# Patient Record
Sex: MALE | Race: ASIAN | NOT HISPANIC OR LATINO | URBAN - METROPOLITAN AREA
[De-identification: names, ages, dates, MRNs, and addresses within clinical notes are randomized per-mention and may not be internally consistent; named-entity substitution may affect disease eponyms.]

---

## 2024-09-04 ENCOUNTER — EMERGENCY (EMERGENCY)
Facility: HOSPITAL | Age: 19
LOS: 1 days | Discharge: HOME | End: 2024-09-04
Attending: EMERGENCY MEDICINE | Admitting: EMERGENCY MEDICINE
Payer: SELF-PAY

## 2024-09-04 VITALS
TEMPERATURE: 98 F | RESPIRATION RATE: 17 BRPM | DIASTOLIC BLOOD PRESSURE: 83 MMHG | HEART RATE: 96 BPM | SYSTOLIC BLOOD PRESSURE: 129 MMHG | OXYGEN SATURATION: 99 %

## 2024-09-04 LAB
ALBUMIN SERPL ELPH-MCNC: 4.9 G/DL — SIGNIFICANT CHANGE UP (ref 3.3–5)
ALP SERPL-CCNC: 98 U/L — SIGNIFICANT CHANGE UP (ref 40–120)
ALT FLD-CCNC: 33 U/L — SIGNIFICANT CHANGE UP (ref 4–41)
ANION GAP SERPL CALC-SCNC: 15 MMOL/L — HIGH (ref 7–14)
AST SERPL-CCNC: 24 U/L — SIGNIFICANT CHANGE UP (ref 4–40)
BASOPHILS # BLD AUTO: 0.05 K/UL — SIGNIFICANT CHANGE UP (ref 0–0.2)
BASOPHILS NFR BLD AUTO: 0.4 % — SIGNIFICANT CHANGE UP (ref 0–2)
BILIRUB SERPL-MCNC: 0.6 MG/DL — SIGNIFICANT CHANGE UP (ref 0.2–1.2)
BUN SERPL-MCNC: 9 MG/DL — SIGNIFICANT CHANGE UP (ref 7–23)
CALCIUM SERPL-MCNC: 10.2 MG/DL — SIGNIFICANT CHANGE UP (ref 8.4–10.5)
CHLORIDE SERPL-SCNC: 102 MMOL/L — SIGNIFICANT CHANGE UP (ref 98–107)
CO2 SERPL-SCNC: 23 MMOL/L — SIGNIFICANT CHANGE UP (ref 22–31)
CREAT SERPL-MCNC: 0.77 MG/DL — SIGNIFICANT CHANGE UP (ref 0.5–1.3)
EGFR: 131 ML/MIN/1.73M2 — SIGNIFICANT CHANGE UP
EGFR: 131 ML/MIN/1.73M2 — SIGNIFICANT CHANGE UP
EOSINOPHIL # BLD AUTO: 0.06 K/UL — SIGNIFICANT CHANGE UP (ref 0–0.5)
EOSINOPHIL NFR BLD AUTO: 0.5 % — SIGNIFICANT CHANGE UP (ref 0–6)
GLUCOSE SERPL-MCNC: 91 MG/DL — SIGNIFICANT CHANGE UP (ref 70–99)
HCT VFR BLD CALC: 46.9 % — SIGNIFICANT CHANGE UP (ref 39–50)
HGB BLD-MCNC: 15.6 G/DL — SIGNIFICANT CHANGE UP (ref 13–17)
IANC: 9.85 K/UL — HIGH (ref 1.8–7.4)
IMM GRANULOCYTES NFR BLD AUTO: 0.5 % — SIGNIFICANT CHANGE UP (ref 0–0.9)
LYMPHOCYTES # BLD AUTO: 17.9 % — SIGNIFICANT CHANGE UP (ref 13–44)
LYMPHOCYTES # BLD AUTO: 2.31 K/UL — SIGNIFICANT CHANGE UP (ref 1–3.3)
MCHC RBC-ENTMCNC: 28.3 PG — SIGNIFICANT CHANGE UP (ref 27–34)
MCHC RBC-ENTMCNC: 33.3 GM/DL — SIGNIFICANT CHANGE UP (ref 32–36)
MCV RBC AUTO: 85.1 FL — SIGNIFICANT CHANGE UP (ref 80–100)
MONOCYTES # BLD AUTO: 0.59 K/UL — SIGNIFICANT CHANGE UP (ref 0–0.9)
MONOCYTES NFR BLD AUTO: 4.6 % — SIGNIFICANT CHANGE UP (ref 2–14)
NEUTROPHILS # BLD AUTO: 9.85 K/UL — HIGH (ref 1.8–7.4)
NEUTROPHILS NFR BLD AUTO: 76.1 % — SIGNIFICANT CHANGE UP (ref 43–77)
NRBC # BLD AUTO: 0 K/UL — SIGNIFICANT CHANGE UP (ref 0–0)
NRBC # BLD: 0 /100 WBCS — SIGNIFICANT CHANGE UP (ref 0–0)
NRBC # FLD: 0 K/UL — SIGNIFICANT CHANGE UP (ref 0–0)
NRBC BLD-RTO: 0 /100 WBCS — SIGNIFICANT CHANGE UP (ref 0–0)
PLATELET # BLD AUTO: 261 K/UL — SIGNIFICANT CHANGE UP (ref 150–400)
POTASSIUM SERPL-MCNC: 3.8 MMOL/L — SIGNIFICANT CHANGE UP (ref 3.5–5.3)
POTASSIUM SERPL-SCNC: 3.8 MMOL/L — SIGNIFICANT CHANGE UP (ref 3.5–5.3)
PROT SERPL-MCNC: 7.8 G/DL — SIGNIFICANT CHANGE UP (ref 6–8.3)
RBC # BLD: 5.51 M/UL — SIGNIFICANT CHANGE UP (ref 4.2–5.8)
RBC # FLD: 12.7 % — SIGNIFICANT CHANGE UP (ref 10.3–14.5)
SODIUM SERPL-SCNC: 140 MMOL/L — SIGNIFICANT CHANGE UP (ref 135–145)
TSH SERPL-MCNC: 0.63 UIU/ML — SIGNIFICANT CHANGE UP (ref 0.27–4.2)
WBC # BLD: 12.92 K/UL — HIGH (ref 3.8–10.5)
WBC # FLD AUTO: 12.92 K/UL — HIGH (ref 3.8–10.5)

## 2024-09-04 PROCEDURE — 70450 CT HEAD/BRAIN W/O DYE: CPT | Mod: 26,MC

## 2024-09-04 PROCEDURE — 99284 EMERGENCY DEPT VISIT MOD MDM: CPT

## 2024-09-04 RX ORDER — METOCLOPRAMIDE HCL 10 MG
10 TABLET ORAL ONCE
Refills: 0 | Status: COMPLETED | OUTPATIENT
Start: 2024-09-04 | End: 2024-09-04

## 2024-09-04 RX ORDER — ACETAMINOPHEN 500 MG/5ML
1000 LIQUID (ML) ORAL ONCE
Refills: 0 | Status: COMPLETED | OUTPATIENT
Start: 2024-09-04 | End: 2024-09-04

## 2024-09-04 RX ADMIN — Medication 400 MILLIGRAM(S): at 21:14

## 2024-09-04 RX ADMIN — Medication 1000 MILLILITER(S): at 21:14

## 2024-09-05 VITALS
DIASTOLIC BLOOD PRESSURE: 84 MMHG | HEART RATE: 91 BPM | RESPIRATION RATE: 18 BRPM | OXYGEN SATURATION: 99 % | SYSTOLIC BLOOD PRESSURE: 131 MMHG | TEMPERATURE: 98 F

## 2024-09-06 ENCOUNTER — OUTPATIENT (OUTPATIENT)
Dept: OUTPATIENT SERVICES | Facility: HOSPITAL | Age: 19
LOS: 1 days | Discharge: ANOTHER HOSPITAL | End: 2024-09-06
Payer: MEDICAID

## 2024-09-06 PROBLEM — Z78.9 OTHER SPECIFIED HEALTH STATUS: Chronic | Status: ACTIVE | Noted: 2024-09-04

## 2024-09-06 PROCEDURE — 99215 OFFICE O/P EST HI 40 MIN: CPT

## 2024-09-10 DIAGNOSIS — F29 UNSPECIFIED PSYCHOSIS NOT DUE TO A SUBSTANCE OR KNOWN PHYSIOLOGICAL CONDITION: ICD-10-CM

## 2024-12-02 ENCOUNTER — EMERGENCY (EMERGENCY)
Facility: HOSPITAL | Age: 19
LOS: 1 days | Discharge: HOME | End: 2024-12-02
Attending: EMERGENCY MEDICINE | Admitting: EMERGENCY MEDICINE
Payer: MEDICAID

## 2024-12-02 ENCOUNTER — INPATIENT (INPATIENT)
Facility: HOSPITAL | Age: 19
LOS: 8 days | Discharge: HOME | End: 2024-12-11
Attending: STUDENT IN AN ORGANIZED HEALTH CARE EDUCATION/TRAINING PROGRAM | Admitting: STUDENT IN AN ORGANIZED HEALTH CARE EDUCATION/TRAINING PROGRAM
Payer: MEDICAID

## 2024-12-02 VITALS
WEIGHT: 176.37 LBS | RESPIRATION RATE: 16 BRPM | HEART RATE: 93 BPM | DIASTOLIC BLOOD PRESSURE: 75 MMHG | TEMPERATURE: 97 F | OXYGEN SATURATION: 97 % | SYSTOLIC BLOOD PRESSURE: 141 MMHG

## 2024-12-02 VITALS
OXYGEN SATURATION: 98 % | DIASTOLIC BLOOD PRESSURE: 79 MMHG | HEART RATE: 88 BPM | SYSTOLIC BLOOD PRESSURE: 131 MMHG | TEMPERATURE: 98 F | RESPIRATION RATE: 16 BRPM

## 2024-12-02 VITALS
HEART RATE: 94 BPM | OXYGEN SATURATION: 100 % | RESPIRATION RATE: 14 BRPM | TEMPERATURE: 99 F | DIASTOLIC BLOOD PRESSURE: 86 MMHG | SYSTOLIC BLOOD PRESSURE: 146 MMHG

## 2024-12-02 LAB
ADD ON TEST-SPECIMEN IN LAB: SIGNIFICANT CHANGE UP
ALBUMIN SERPL ELPH-MCNC: 4.5 G/DL — SIGNIFICANT CHANGE UP (ref 3.3–5)
ALBUMIN SERPL ELPH-MCNC: 4.7 G/DL — SIGNIFICANT CHANGE UP (ref 3.3–5)
ALP SERPL-CCNC: 79 U/L — SIGNIFICANT CHANGE UP (ref 40–120)
ALP SERPL-CCNC: 83 U/L — SIGNIFICANT CHANGE UP (ref 40–120)
ALT FLD-CCNC: 22 U/L — SIGNIFICANT CHANGE UP (ref 4–41)
ALT FLD-CCNC: 26 U/L — SIGNIFICANT CHANGE UP (ref 4–41)
ANION GAP SERPL CALC-SCNC: 15 MMOL/L — HIGH (ref 7–14)
ANION GAP SERPL CALC-SCNC: 16 MMOL/L — HIGH (ref 7–14)
APAP SERPL-MCNC: <10 UG/ML — LOW (ref 15–25)
APPEARANCE UR: CLEAR — SIGNIFICANT CHANGE UP
AST SERPL-CCNC: 16 U/L — SIGNIFICANT CHANGE UP (ref 4–40)
AST SERPL-CCNC: 24 U/L — SIGNIFICANT CHANGE UP (ref 4–40)
BASOPHILS # BLD AUTO: 0.02 K/UL — SIGNIFICANT CHANGE UP (ref 0–0.2)
BASOPHILS # BLD AUTO: 0.05 K/UL — SIGNIFICANT CHANGE UP (ref 0–0.2)
BASOPHILS NFR BLD AUTO: 0.1 % — SIGNIFICANT CHANGE UP (ref 0–2)
BASOPHILS NFR BLD AUTO: 0.2 % — SIGNIFICANT CHANGE UP (ref 0–2)
BILIRUB SERPL-MCNC: 0.6 MG/DL — SIGNIFICANT CHANGE UP (ref 0.2–1.2)
BILIRUB SERPL-MCNC: 0.6 MG/DL — SIGNIFICANT CHANGE UP (ref 0.2–1.2)
BILIRUB UR-MCNC: NEGATIVE — SIGNIFICANT CHANGE UP
BLOOD GAS VENOUS COMPREHENSIVE RESULT: SIGNIFICANT CHANGE UP
BUN SERPL-MCNC: 15 MG/DL — SIGNIFICANT CHANGE UP (ref 7–23)
BUN SERPL-MCNC: 18 MG/DL — SIGNIFICANT CHANGE UP (ref 7–23)
CALCIUM SERPL-MCNC: 9.6 MG/DL — SIGNIFICANT CHANGE UP (ref 8.4–10.5)
CALCIUM SERPL-MCNC: 9.9 MG/DL — SIGNIFICANT CHANGE UP (ref 8.4–10.5)
CHLORIDE SERPL-SCNC: 101 MMOL/L — SIGNIFICANT CHANGE UP (ref 98–107)
CHLORIDE SERPL-SCNC: 104 MMOL/L — SIGNIFICANT CHANGE UP (ref 98–107)
CO2 SERPL-SCNC: 20 MMOL/L — LOW (ref 22–31)
CO2 SERPL-SCNC: 21 MMOL/L — LOW (ref 22–31)
COLOR SPEC: YELLOW — SIGNIFICANT CHANGE UP
CREAT SERPL-MCNC: 0.85 MG/DL — SIGNIFICANT CHANGE UP (ref 0.5–1.3)
CREAT SERPL-MCNC: 0.89 MG/DL — SIGNIFICANT CHANGE UP (ref 0.5–1.3)
DIFF PNL FLD: NEGATIVE — SIGNIFICANT CHANGE UP
EGFR: 126 ML/MIN/1.73M2 — SIGNIFICANT CHANGE UP
EGFR: 128 ML/MIN/1.73M2 — SIGNIFICANT CHANGE UP
EOSINOPHIL # BLD AUTO: 0 K/UL — SIGNIFICANT CHANGE UP (ref 0–0.5)
EOSINOPHIL # BLD AUTO: 0.02 K/UL — SIGNIFICANT CHANGE UP (ref 0–0.5)
EOSINOPHIL NFR BLD AUTO: 0 % — SIGNIFICANT CHANGE UP (ref 0–6)
EOSINOPHIL NFR BLD AUTO: 0.1 % — SIGNIFICANT CHANGE UP (ref 0–6)
ETHANOL SERPL-MCNC: <10 MG/DL — SIGNIFICANT CHANGE UP
GLUCOSE SERPL-MCNC: 110 MG/DL — HIGH (ref 70–99)
GLUCOSE SERPL-MCNC: 162 MG/DL — HIGH (ref 70–99)
GLUCOSE UR QL: 500 MG/DL
HCT VFR BLD CALC: 43.8 % — SIGNIFICANT CHANGE UP (ref 39–50)
HCT VFR BLD CALC: 43.9 % — SIGNIFICANT CHANGE UP (ref 39–50)
HGB BLD-MCNC: 15.1 G/DL — SIGNIFICANT CHANGE UP (ref 13–17)
HGB BLD-MCNC: 15.1 G/DL — SIGNIFICANT CHANGE UP (ref 13–17)
IANC: 16.33 K/UL — HIGH (ref 1.8–7.4)
IANC: 17.14 K/UL — HIGH (ref 1.8–7.4)
IMM GRANULOCYTES NFR BLD AUTO: 0.6 % — SIGNIFICANT CHANGE UP (ref 0–0.9)
IMM GRANULOCYTES NFR BLD AUTO: 0.9 % — SIGNIFICANT CHANGE UP (ref 0–0.9)
KETONES UR-MCNC: NEGATIVE MG/DL — SIGNIFICANT CHANGE UP
LEUKOCYTE ESTERASE UR-ACNC: NEGATIVE — SIGNIFICANT CHANGE UP
LYMPHOCYTES # BLD AUTO: 1.32 K/UL — SIGNIFICANT CHANGE UP (ref 1–3.3)
LYMPHOCYTES # BLD AUTO: 17.7 % — SIGNIFICANT CHANGE UP (ref 13–44)
LYMPHOCYTES # BLD AUTO: 4.03 K/UL — HIGH (ref 1–3.3)
LYMPHOCYTES # BLD AUTO: 7.4 % — LOW (ref 13–44)
MAGNESIUM SERPL-MCNC: 2.1 MG/DL — SIGNIFICANT CHANGE UP (ref 1.6–2.6)
MAGNESIUM SERPL-MCNC: 2.3 MG/DL — SIGNIFICANT CHANGE UP (ref 1.6–2.6)
MCHC RBC-ENTMCNC: 28.5 PG — SIGNIFICANT CHANGE UP (ref 27–34)
MCHC RBC-ENTMCNC: 28.8 PG — SIGNIFICANT CHANGE UP (ref 27–34)
MCHC RBC-ENTMCNC: 34.4 G/DL — SIGNIFICANT CHANGE UP (ref 32–36)
MCHC RBC-ENTMCNC: 34.5 G/DL — SIGNIFICANT CHANGE UP (ref 32–36)
MCV RBC AUTO: 82.6 FL — SIGNIFICANT CHANGE UP (ref 80–100)
MCV RBC AUTO: 83.8 FL — SIGNIFICANT CHANGE UP (ref 80–100)
MONOCYTES # BLD AUTO: 0.12 K/UL — SIGNIFICANT CHANGE UP (ref 0–0.9)
MONOCYTES # BLD AUTO: 1.32 K/UL — HIGH (ref 0–0.9)
MONOCYTES NFR BLD AUTO: 0.7 % — LOW (ref 2–14)
MONOCYTES NFR BLD AUTO: 5.8 % — SIGNIFICANT CHANGE UP (ref 2–14)
NEUTROPHILS # BLD AUTO: 16.33 K/UL — HIGH (ref 1.8–7.4)
NEUTROPHILS # BLD AUTO: 17.14 K/UL — HIGH (ref 1.8–7.4)
NEUTROPHILS NFR BLD AUTO: 75.3 % — SIGNIFICANT CHANGE UP (ref 43–77)
NEUTROPHILS NFR BLD AUTO: 91.2 % — HIGH (ref 43–77)
NITRITE UR-MCNC: NEGATIVE — SIGNIFICANT CHANGE UP
NRBC # BLD: 0 /100 WBCS — SIGNIFICANT CHANGE UP (ref 0–0)
NRBC # BLD: 0 /100 WBCS — SIGNIFICANT CHANGE UP (ref 0–0)
NRBC # FLD: 0 K/UL — SIGNIFICANT CHANGE UP (ref 0–0)
NRBC # FLD: 0 K/UL — SIGNIFICANT CHANGE UP (ref 0–0)
PH UR: 8 — SIGNIFICANT CHANGE UP (ref 5–8)
PHOSPHATE SERPL-MCNC: 1.9 MG/DL — LOW (ref 2.5–4.5)
PLATELET # BLD AUTO: 270 K/UL — SIGNIFICANT CHANGE UP (ref 150–400)
PLATELET # BLD AUTO: 287 K/UL — SIGNIFICANT CHANGE UP (ref 150–400)
POTASSIUM SERPL-MCNC: 3.5 MMOL/L — SIGNIFICANT CHANGE UP (ref 3.5–5.3)
POTASSIUM SERPL-MCNC: 4.5 MMOL/L — SIGNIFICANT CHANGE UP (ref 3.5–5.3)
POTASSIUM SERPL-SCNC: 3.5 MMOL/L — SIGNIFICANT CHANGE UP (ref 3.5–5.3)
POTASSIUM SERPL-SCNC: 4.5 MMOL/L — SIGNIFICANT CHANGE UP (ref 3.5–5.3)
PROT SERPL-MCNC: 7.5 G/DL — SIGNIFICANT CHANGE UP (ref 6–8.3)
PROT SERPL-MCNC: 7.7 G/DL — SIGNIFICANT CHANGE UP (ref 6–8.3)
PROT UR-MCNC: NEGATIVE MG/DL — SIGNIFICANT CHANGE UP
RBC # BLD: 5.24 M/UL — SIGNIFICANT CHANGE UP (ref 4.2–5.8)
RBC # BLD: 5.3 M/UL — SIGNIFICANT CHANGE UP (ref 4.2–5.8)
RBC # FLD: 11.9 % — SIGNIFICANT CHANGE UP (ref 10.3–14.5)
RBC # FLD: 12.2 % — SIGNIFICANT CHANGE UP (ref 10.3–14.5)
SALICYLATES SERPL-MCNC: <0.3 MG/DL — LOW (ref 15–30)
SODIUM SERPL-SCNC: 137 MMOL/L — SIGNIFICANT CHANGE UP (ref 135–145)
SODIUM SERPL-SCNC: 140 MMOL/L — SIGNIFICANT CHANGE UP (ref 135–145)
SP GR SPEC: 1.05 — HIGH (ref 1–1.03)
TOXICOLOGY SCREEN, DRUGS OF ABUSE, SERUM RESULT: SIGNIFICANT CHANGE UP
TSH SERPL-MCNC: 0.66 UIU/ML — SIGNIFICANT CHANGE UP (ref 0.27–4.2)
UROBILINOGEN FLD QL: 1 MG/DL — SIGNIFICANT CHANGE UP (ref 0.2–1)
WBC # BLD: 17.9 K/UL — HIGH (ref 3.8–10.5)
WBC # BLD: 22.77 K/UL — HIGH (ref 3.8–10.5)
WBC # FLD AUTO: 17.9 K/UL — HIGH (ref 3.8–10.5)
WBC # FLD AUTO: 22.77 K/UL — HIGH (ref 3.8–10.5)

## 2024-12-02 PROCEDURE — 74177 CT ABD & PELVIS W/CONTRAST: CPT | Mod: 26,MC

## 2024-12-02 PROCEDURE — 71260 CT THORAX DX C+: CPT | Mod: 26,MC

## 2024-12-02 PROCEDURE — 99284 EMERGENCY DEPT VISIT MOD MDM: CPT

## 2024-12-02 PROCEDURE — 99291 CRITICAL CARE FIRST HOUR: CPT | Mod: 25

## 2024-12-02 PROCEDURE — 72125 CT NECK SPINE W/O DYE: CPT | Mod: 26,MC

## 2024-12-02 PROCEDURE — 70450 CT HEAD/BRAIN W/O DYE: CPT | Mod: 26,MC

## 2024-12-02 PROCEDURE — 71045 X-RAY EXAM CHEST 1 VIEW: CPT | Mod: 26

## 2024-12-02 PROCEDURE — 62270 DX LMBR SPI PNXR: CPT | Mod: 52

## 2024-12-02 RX ORDER — KETOROLAC TROMETHAMINE 30 MG/ML
15 INJECTION INTRAMUSCULAR; INTRAVENOUS ONCE
Refills: 0 | Status: DISCONTINUED | OUTPATIENT
Start: 2024-12-02 | End: 2024-12-02

## 2024-12-02 RX ORDER — METOCLOPRAMIDE HYDROCHLORIDE 10 MG/1
10 TABLET ORAL ONCE
Refills: 0 | Status: COMPLETED | OUTPATIENT
Start: 2024-12-02 | End: 2024-12-02

## 2024-12-02 RX ORDER — SODIUM,POTASSIUM PHOSPHATES 278-250MG
1 POWDER IN PACKET (EA) ORAL ONCE
Refills: 0 | Status: COMPLETED | OUTPATIENT
Start: 2024-12-02 | End: 2024-12-02

## 2024-12-02 RX ORDER — ACETAMINOPHEN 500MG 500 MG/1
1000 TABLET, COATED ORAL ONCE
Refills: 0 | Status: COMPLETED | OUTPATIENT
Start: 2024-12-02 | End: 2024-12-02

## 2024-12-02 RX ORDER — SODIUM CHLORIDE 9 MG/ML
1000 INJECTION, SOLUTION INTRAMUSCULAR; INTRAVENOUS; SUBCUTANEOUS ONCE
Refills: 0 | Status: COMPLETED | OUTPATIENT
Start: 2024-12-02 | End: 2024-12-02

## 2024-12-02 RX ORDER — LIDOCAINE HCL 20 MG/ML
10 VIAL (ML) INJECTION ONCE
Refills: 0 | Status: COMPLETED | OUTPATIENT
Start: 2024-12-02 | End: 2024-12-02

## 2024-12-02 RX ADMIN — ACETAMINOPHEN 500MG 400 MILLIGRAM(S): 500 TABLET, COATED ORAL at 18:03

## 2024-12-02 RX ADMIN — Medication 10 MILLILITER(S): at 23:38

## 2024-12-02 RX ADMIN — Medication 1 PACKET(S): at 02:57

## 2024-12-02 RX ADMIN — ACETAMINOPHEN 500MG 400 MILLIGRAM(S): 500 TABLET, COATED ORAL at 01:52

## 2024-12-02 RX ADMIN — KETOROLAC TROMETHAMINE 15 MILLIGRAM(S): 30 INJECTION INTRAMUSCULAR; INTRAVENOUS at 01:51

## 2024-12-02 RX ADMIN — SODIUM CHLORIDE 1000 MILLILITER(S): 9 INJECTION, SOLUTION INTRAMUSCULAR; INTRAVENOUS; SUBCUTANEOUS at 18:02

## 2024-12-02 RX ADMIN — SODIUM CHLORIDE 1000 MILLILITER(S): 9 INJECTION, SOLUTION INTRAMUSCULAR; INTRAVENOUS; SUBCUTANEOUS at 01:51

## 2024-12-02 RX ADMIN — METOCLOPRAMIDE HYDROCHLORIDE 10 MILLIGRAM(S): 10 TABLET ORAL at 01:51

## 2024-12-02 NOTE — CONSULT NOTE ADULT - ASSESSMENT
19y (2005) Maira speaking man with a PMHx significant for questionable history of schizophrenia versus bipolar disorder previously prescribed Zyprexa from a psychiatric hospitalization in [place] months ago that ran out of medication 10 days ago that presents with persistent HA with significant features of vision changes with a positional component. WBC currently 22.7K. CTH , CT C spine, and CT CAP negative.     Impression: Persistent positional headache with significant features and possible seizure i/s/o leukocytosis of 22K. Etiology unclear. The level of leukocytosis not fully explained by seizure (if indeed seizure). Concern for increased ICP of unknown cause. Lower suspicion for meninigitis based on physical exam however cannot fully exclude. Would r/o meningitis, elevated icp, other infectious or intracranial etiologies.    Recommendations:  Would obtain LP stat to include the following:  -Obtain opening pressure  - CSF studies including: cell count, glucose, protein, gram stain and culture, acid fast culture, fungal culture, cryptococcal antigen, fungal culture, VDRL, HSV, Lyme, West Nile, PCR Encephalitis panel, oligoclonal bands, myelin basic protein, lactate, IgG index, IgG synthesis rate.  Serum work up: Check UA, Utox, TSH, T3/T4, RPR, vitamin B1, B6, B12, folate, lactate (3.1), Vitamin D 25 OH, creatinine kinase, ammonia,  ESR, CRP, Lyme Abs, WNV, Anti-MOG Ab, NMO/AQP4 Ab.   EKG  Obtain MRI brain w/wo contrast   VEEG to evaluate for focal slowing, epileptiform discharges or seizures  Medical management: Empiric coverage for viral and bacterial meningitis prior to LP. Would not start an AED at this time.   Further infectious work up as per primary team     Rest of management as per primary team     Case to be seen by attending.  Note incomplete until finalized by attending signature/attestation.   19y (2005) Maira speaking man with a PMHx significant for questionable history of schizophrenia versus bipolar disorder previously prescribed Zyprexa from a psychiatric hospitalization in [place] months ago that ran out of medication 10 days ago that presents with persistent HA with significant features of vision changes with a positional component. WBC currently 22.7K. CTH , CT C spine, and CT CAP negative.     Impression: Persistent positional headache with significant features and possible seizure i/s/o leukocytosis of 22K. Etiology unclear. The level of leukocytosis not fully explained by seizure (if indeed seizure). Concern for increased ICP of unknown cause. Lower suspicion for meninigitis based on physical exam however cannot fully exclude. Would r/o meningitis, elevated icp, other infectious or intracranial etiologies.    Recommendations:  Would obtain LP stat to include the following:  -Obtain opening pressure  - CSF studies including: cell count, glucose, protein, gram stain and culture, acid fast culture, fungal culture, cryptococcal antigen, fungal culture, VDRL, HSV, Lyme, West Nile, PCR Encephalitis panel, oligoclonal bands, myelin basic protein, lactate, IgG index, IgG synthesis rate.  Serum work up: Check UA, Utox, TSH, T3/T4, RPR, vitamin B1, B6, B12, folate, lactate (3.1), Vitamin D 25 OH, creatinine kinase, ammonia,  ESR, CRP, Lyme Abs, WNV, Anti-MOG Ab, NMO/AQP4 Ab.   EKG  MRI brain w/wo contrast   24 hour VEEG to evaluate for focal slowing, epileptiform discharges or seizures  Medical management: Empiric coverage for viral and bacterial meningitis prior to LP. Would not start an AED at this time.   Further infectious work up as per primary team     Rest of management as per primary team     Case to be seen by attending.  Note incomplete until finalized by attending signature/attestation.

## 2024-12-02 NOTE — ED PROVIDER NOTE - ATTENDING CONTRIBUTION TO CARE
Attending note:   After face to face evaluation of this patient, I concur with above noted hx, pe, and care plan for this patient.  Rivera: History taken by cousins at patient's bedside as patient is not currently able to talk.  Patient has history of a possible seizure disorder 2 months ago after a party in [place].  Patient was started on medications but did not continue them or follow-up.  Patient had another similar episode about 1 month later.  Last night patient was seen in the ED for headache and felt better and was sent home.  Today patient was in the bathroom when he had episode of shaking and falling to the ground and since then he has been unresponsive.  Family denies any drug use.  Patient's blood pressure is stable and patient is febrile.  Patient is somnolent and minimally responsive to painful stimuli.  However gag is present.  Pupils are reactive.  No seizure-like activity or tongue biting is noted.  There is no defecation or urination.  Lungs are clear and heart is regular.  Neck is supple.  Abdomen soft nontender.  There is no response to questions or commands.  Patient initially appears to be in a postictal state but this could also be due to substance or other metabolic process.  Would get CT head, labs, cardiac monitor, drug screen.  This does not appear to be narcotics given patient's vital signs.  Will ask neuro to evaluate as well for possible seizure disorder. Patient signed out to Dr. Vasquez.

## 2024-12-02 NOTE — CONSULT NOTE ADULT - SUBJECTIVE AND OBJECTIVE BOX
Neurology - Consult Note    -  Spectra: [phone] ([facility]), [phone] ([facility]  -     used: [id number] Manpreet        HPI: Patient PEBBLES FRY is a 19y (2005) Maira speaking man with a PMHx significant for questionable history of schizophrenia versus bipolar disorder previously prescribed Zyprexa from a psychiatric hospitalization in [place] months ago that ran out of medication 10 days ago that presents with headache for 2-3 days.  Patient accompanied by brother who assisted however both patient and brother poor historians. Patient stated that 2-3 days ago he started having a sudden onset sharp occipital headache radiating to the top of the head with associated vision changes (unspecified). He went to the ED at [facility] last night for the headache and was not treated with any medication however headache yet improved on its own and was discharged. CTH at that time was neg. labs at that time significant for leukocytosis of 17k and phos 1.9. Today patient stated HA worsened with now associated worsening positional component (worsens with laying flat). HA has lasted for multiple hours. He also has associated CP and "jitters". As per Brother, today had an episode where he was walking to his room and collapsed without headstrike and LOC? for around 2-3min with possible post ictal state and slight urine incontinence without tongue biting.   He is endorsing mild posterior neck pain, whole body weakness and cold sweats. Denied numbness, fevers, recent travel, room spinning dizziness, rashes, sick contacts. [place].    Regards to prior HA, patient stated he had 2-3 very similar episodes of headache in the past managed in the hospital and once received a medication for one month which improved the headache. He does not recall the name of the medication.     Of note brother stated that two months ago patient had a similar fall and shaking episode and had an "abnormal brain test". He is not sure what the results were and did not follow up with a neurologist.     While in the ED patient received Ofirmev and fluids did not improve YEPEZ.       Bp 146/86, hr 94, no fever  WBC 17.9 > 22.7 neutrophil predominance   Lactate 3.1     Review of Systems:        All other review of systems is negative unless indicated above.    Allergies:  No Known Allergies      PMHx/PSHx/Family Hx: As above, otherwise see below   No pertinent past medical history    Anxiety        Social Hx:  No reported use of tobacco, alcohol, or illicit drugs    Medications:  MEDICATIONS  (STANDING):    MEDICATIONS  (PRN):        Home Medications:      Vitals:  T(C): 37 (12-02-24 @ 16:32), Max: 37 (12-02-24 @ 16:32)  HR: 90 (12-02-24 @ 17:53) (88 - 94)  BP: 137/72 (12-02-24 @ 17:53) (131/79 - 146/86)  RR: 18 (12-02-24 @ 17:53) (14 - 18)  SpO2: 99% (12-02-24 @ 17:53) (97% - 100%)    Physical Examination:    General - uncomfortable   Cardiovascular - Peripheral pulses palpable, no edema    Neurologic Exam:  Mental status - Awake, Alert, Oriented to person, place, and time. Speech fluent but hypophonic, repetition and naming intact. Follows simple and complex commands. attention, concentration and fund of knowledge limited.     Cranial nerves:  CN II: Visual fields are full to confrontation. Pupils are 3 mm and reactive to light.   CN III, IV, VI: EOMI, no nystagmus, no ptosis  CN V: Facial sensation is intact to pinprick in all 3 divisions bilaterally.  CN VII: Face is symmetric with normal eye closure and smile.  CN VII: Hearing is normal to rubbing fingers  CN IX, X: Palate elevates symmetrically. Phonation is normal.  CN XI: Head turning and shoulder shrug are intact  CN XII: Tongue is midline with normal movements and no atrophy.    Motor - Normal bulk and tone throughout. No pronator drift.  Posterior neck pain and occipital tenderness  Negative kernig and brudzinski signs    Strength testing  On confrontation likely 5/5 with giveway weakness throughout     neg hoffmans b/l    Sensation - Light touch/temperature intact throughout    DTR's -             Biceps      Triceps     Brachioradialis      Patellar    Ankle    Toes/plantar response  R             2+             2+                  2+                       2+            2+                 Down  L              2+             2+                 2+                        2+           2+                 Down    Coordination - Finger to Nose intact b/l. No tremors appreciated    Gait and station - On attempt for ambulation patient slipped off the bed needing two person assist to lay him back on the stretcher    Labs:                        15.1   22.77 )-----------( 287      ( 02 Dec 2024 16:20 )             43.9     12-02    140  |  104  |  18  ----------------------------<  110[H]  3.5   |  21[L]  |  0.89    Ca    9.6      02 Dec 2024 16:20  Phos  1.9     12-02  Mg     2.30     12-02    TPro  7.5  /  Alb  4.5  /  TBili  0.6  /  DBili  x   /  AST  16  /  ALT  22  /  AlkPhos  79  12-02    CAPILLARY BLOOD GLUCOSE      POCT Blood Glucose.: 145 mg/dL (02 Dec 2024 16:25)    LIVER FUNCTIONS - ( 02 Dec 2024 16:20 )  Alb: 4.5 g/dL / Pro: 7.5 g/dL / ALK PHOS: 79 U/L / ALT: 22 U/L / AST: 16 U/L / GGT: x             Urinalysis with Rflx Culture (collected 02 Dec 2024 20:35)               Radiology:  CT Head No Cont:  (02 Dec 2024 19:19)  CT HEAD:  No acute intracranial hemorrhage, mass effect, or midline shift.    CT CERVICAL SPINE:  No acute fracture or traumatic subluxation.    Ct CAP w iv contrast:  IMPRESSION:  No acute intrathoracic, abdominal or pelvic findings.

## 2024-12-02 NOTE — ED ADULT NURSE NOTE - OBJECTIVE STATEMENT
patient brought in by brother stating he was unconscious and unsure what happened, also states that patient takes meds for his brain. patient breathing spontaneously , not answering questions , asking for his mom and water . connected to CM shows NSR. labs done as ordered IV saline inserted and flushes well. will continue to monitor. family denies any use of alcohol  or drugs.

## 2024-12-02 NOTE — ED PROVIDER NOTE - CLINICAL SUMMARY MEDICAL DECISION MAKING FREE TEXT BOX
19-year-old male with a history of anxiety,?seizures presenting with altered mental status.  Per patient's cousins at bedside patient had possible seizure activity approximately 2 months ago. Concern for toxidrome vs postictal state vs infectious process or intracranial pathology.  Patient afebrile.  No known substance ingestion/intoxication per family. Labs, CXR, CTs, UA ordered.

## 2024-12-02 NOTE — ED ADULT TRIAGE NOTE - CHIEF COMPLAINT QUOTE
Pt arrived to walk in triage unresponsive w/ pulse and unlabored respirations. Pt brought in by wheelchair w/ family member. Charge RN made aware. Pt brought directly to TR B.

## 2024-12-02 NOTE — ED ADULT NURSE REASSESSMENT NOTE - NS ED NURSE REASSESS COMMENT FT1
pt restless in stretcher, reporting increasing chest pain. repeat EKG completed at bedside. pt urinated on bed, pt cleaned and linens changed. condom catheter in place. maintained on cardiac monitor, VSS. safety measures maintained, side rails up x2. visitor at bedside

## 2024-12-02 NOTE — ED PROVIDER NOTE - PATIENT PORTAL LINK FT
You can access the FollowMyHealth Patient Portal offered by [facility] by registering at the following website: [email or web address]. By joining [employer]’s FollowMyHealth portal, you will also be able to view your health information using other applications (apps) compatible with our system.

## 2024-12-02 NOTE — ED PROVIDER NOTE - MDM ORDERS SUBMITTED SELECTION
[facility] [address] [place]   Imaging Studies/Medications

## 2024-12-02 NOTE — ED PROVIDER NOTE - OBJECTIVE STATEMENT
19-year-old male with a history of anxiety,?seizures presenting with altered mental status.  Per patient's cousins at bedside patient had possible seizure activity approximately 2 months ago.  He was evaluated at a hospital in [place] at which time they did imaging and they did not find anything or start him on any medications.  About 1 month ago he had a similar episode that was attributed to anxiety and he was started on Zyprexa.  He took Zyprexa for approximately 30 days but stopped 10 days ago 2/2 running out of his medications.  He was evaluated last night in the emergency department for a headache and treated for headache.  He was feeling significantly improved before going home earlier today.  While at home he went to go to the bathroom and had an episode of shaking and fell to the ground.  His family was concerned and he presented to the ED for further evaluation.  Unable to complete ROS 2/2 AMS.

## 2024-12-02 NOTE — ED PROVIDER NOTE - CLINICAL SUMMARY MEDICAL DECISION MAKING FREE TEXT BOX
Keanu Valentine, PGY3 - [facility]  used ID number [id number] [facility]. this is a 19-year-old male with past medical history of possible anxiety was on Zyprexa unknown dosage for about a month ran out of medications about 10 days ago presenting today for 1 week of headache.  Headache started gradually.  In the posterior head.  No vision changes.  Has a bit of nausea but no vomiting.  No fever or chills.  No neck stiffness.  No chest pain shortness of breath.  No abdominal pain.  No urinary symptoms.  No sexual activity.  No recent travel.  No bug bites or rash. Denies any SI or HI or AH or VH. Vital signs within normal limits.  Patient well-appearing not in acute distress.  Affect is slightly odd.  However thought process is linear.  No focal neurodeficits.  Cranial nerves are intact.  No acute respiratory distress.  Abdomen soft and nontender.  Most likely headache possibly due to withdrawing from Zyprexa as patient ran out of medications about 10 days ago.  Will give follow-up with psych crisis center.  Will obtain basic blood work.  Will give of fluid bolus Toradol Ofirmev and Reglan for headache.  Will reassess afterwards.  Most likely DC with neuro and psych follow-up.  Do not think this is meningitis.  Do not think this is intracranial bleeding.

## 2024-12-02 NOTE — ED PROVIDER NOTE - PROGRESS NOTE DETAILS
Keanu Valentine, PGY3 - patient reassessed. Appears sleepy. Will reassess again in a little bit when brother comes back. Keanu Valentine, PGY3 - patient feeling much better. brother here to bring him back home. will dc w/ psych and neuro f/u.

## 2024-12-02 NOTE — ED PROVIDER NOTE - PHYSICAL EXAMINATION
GENERAL: lethargic  HEAD: normocephalic, atraumatic  HEENT: normal conjunctiva, oral mucosa dry  CARDIAC: regular rate and rhythm  PULM: no increased work of breathing, CTAB  GI: abdomen nondistended, soft, nontender  : no suprapubic tenderness  NEURO: moving all 4 extremities, lethargic, not answering questions, PERLLA  MSK: no peripheral edema  SKIN: extremities warm

## 2024-12-02 NOTE — ED PROVIDER NOTE - PROGRESS NOTE DETAILS
[place] PGY3 - Lab work notable for WBC 22, lactate 3.1.  Lab work otherwise unremarkable.  CT head/C-spine/chest/abdomen/pelvis without any acute pathology.  UA pending.  Neurology consulted for possible seizure activity. CORNELL:  Neuro recommending LP for evaluation of elevated ICP.  Low c/f meningitis.  Informed consent had with [facility]  by Dr. Pruett.  Time out called with RN.  Lumbar puncture attempted by Dr. Pruett but unable to obtain csf specimen.  No immediate complications however patient reporting pain with needle insertion despite local anesthetic and declined further attempts or attempt by myself (attending physician).  Patient to be admitted to medicine service.  Signed out to Dr. Meier in stable condition.

## 2024-12-02 NOTE — ED ADULT TRIAGE NOTE - CHIEF COMPLAINT QUOTE
pt c/o fatigue and posterior head pain. states he has the same symptoms as his last visit from 2 days ago. denies phx. pt c/o fatigue and posterior head pain. states he has the same symptoms as his last visit from 2 days ago. denies phx. pt noted to have strange affect in triage.

## 2024-12-02 NOTE — ED PROVIDER NOTE - NSFOLLOWUPCLINICS_GEN_ALL_ED_FT
[facility]  Neurology  [address] [place] [address] - [address] [place] [address]  Phone: [phone]  Fax:   Follow Up Time: 4-6 Days    [facility]  Psychiatry  [address] [place] [id number]  Phone: [phone]  Fax:   Follow Up Time: 4-6 Days

## 2024-12-02 NOTE — ED PROVIDER NOTE - NSFOLLOWUPINSTRUCTIONS_ED_ALL_ED_FT
You were seen in the emergency department tonight for headache and irritability    We think your condition is most likely secondary to the fact that you stopped taking the medication that was prescribed by her previous psychiatrist called Zyprexa    It is a known side effect of stopping this medication is causing headaches    We recommend you follow-up with a psychiatrist at our crisis center over the [facility] which is across the street from this hospital    You can walk-in anytime or call to make an appointment so that you can be evaluated by a psychiatrist and restarted on medications as they think you need    In the meantime please take Tylenol, ibuprofen and drink plenty of fluids and get plenty of rest to see if this helps your headache    Attached to these discharge instructions of the blood test that we obtained today please take these to the psychiatrist and follow-up with your primary care doctor with these test as soon as you are discharged from the hospital so that they can see if there is any additional testing or treatment needed as an outpatient.    If at any moment you start having worsening headaches, difficulty seeing or hearing, you start hearing or seeing voices or things that are not seen by other people or you start feeling that you want hurt yourself or others please come back to the hospital as soon as possible for further evaluation and treatment.

## 2024-12-02 NOTE — ED PROVIDER NOTE - ATTENDING CONTRIBUTION TO CARE
Maira Diallo [facility] # [id number]    19-year-old male that has supposedly no medical problems although questionable history of schizophrenia versus bipolar disorder previously on Zyprexa which he ran out of 10 days ago that presents with brother at bedside for headache and generalized fatigue.  Patient states that he was seen here in September for similar issues and since that time was taking Zyprexa unknown dosage but he ran out a few days ago.  He is coming in with 1 week of headaches posterior with generalized fatigue not sleeping well secondary to the headache.  Denies any SI/HI/hallucinations.  Patient is a poor historian but denies any vision or hearing changes, nausea, vomiting, chest pain, shortness of breath, abdominal pain, paresthesias, weakness, falls, rashes.  No foreign travel no sick contacts.  Of note patient states that he went to a party months ago and woke up in the psychiatric hospital in [place] and thus when he got started on these medications for which she does not know why but his brother at bedside states that he has had some mental issues for the last few months.  He has been trying to see a doctor/psychiatrist but has been unsuccessful in getting a appointment and refill of his medications which he was compliant with prior to running out of medications.  Denies any smoking drinking or drugs including no IV drug use    Patient appears uncomfortable constantly playing with his hair and rubbing his head.  He is speaking full sentences.  Cranial nerves II through XII are intact eyes are reactive bilaterally no nystagmus.  Heart is regular rate and rhythm lungs are clear to auscultation abdomen soft nontender moving all 4 extremities actively.  Does not appear to be internally preoccupied.  Denies SI/HI.    19-year-old male that denies any medical issues but a questionable history of schizophrenia versus bipolar disorder previously prescribed Zyprexa from a psychiatric hospitalization in [place] months ago that ran out of medication 10 days ago that presents with headache and irritability and inability to sleep well for the last week.  Patient has a benign exam and his vital signs are noted to be relatively normal although hypertensive but not significant enough to think that this patient is having a CVA.  I reviewed his chart for which she was here in September for similar issues and at that time had a CT of his head which was unremarkable and labs otherwise unremarkable time as well.  Patient's brother at bedside is also poor historian and not able to tell us what dosage his medications were or why he was on this medications but other than just "mental issues".  I do think that patient's headache is most likely secondary to Zyprexa withdrawal as this is a possible side effect of taking the medication then stopping without tapering off.  He has a normal cranial nerve exam and his vital signs are normal.  He denies any risk factors for CVA or meningitis as he is not in the [employer] or not around other sick contacts and has been having symptoms for 7 days but no fevers or chills and he is not necessarily altered per his brother.  Has been tolerating p.o. otherwise.  At this time will work off the thought that this is most likely withdrawal but will get blood test to treat the headache with medications and fluids but clinically I do not think patient requires any imaging today.  I advised brother and patient that he should follow-up outpatient with a psychiatrist for refill of his meds that this is most likely due to him stopping his medications.  I do not think it would be safe to start him on Zyprexa today as we do not know his dosage or the reasons why and he should be formally evaluated by a psychiatrist that could prescribe the medications as an outpatient.  Will reassess after workup in the ED.

## 2024-12-02 NOTE — ED ADULT NURSE NOTE - SUICIDE SCREENING QUESTION 3
The current medical regimen is effective;  continue present plan and medications.    Patient unable to complete

## 2024-12-03 DIAGNOSIS — R51.9 HEADACHE, UNSPECIFIED: ICD-10-CM

## 2024-12-03 DIAGNOSIS — F20.9 SCHIZOPHRENIA, UNSPECIFIED: ICD-10-CM

## 2024-12-03 DIAGNOSIS — Z29.9 ENCOUNTER FOR PROPHYLACTIC MEASURES, UNSPECIFIED: ICD-10-CM

## 2024-12-03 DIAGNOSIS — R41.82 ALTERED MENTAL STATUS, UNSPECIFIED: ICD-10-CM

## 2024-12-03 DIAGNOSIS — Z79.899 OTHER LONG TERM (CURRENT) DRUG THERAPY: ICD-10-CM

## 2024-12-03 LAB
24R-OH-CALCIDIOL SERPL-MCNC: 11.1 NG/ML — LOW (ref 30–80)
ADD ON TEST-SPECIMEN IN LAB: SIGNIFICANT CHANGE UP
ADD ON TEST-SPECIMEN IN LAB: SIGNIFICANT CHANGE UP
ALBUMIN SERPL ELPH-MCNC: 3.9 G/DL — SIGNIFICANT CHANGE UP (ref 3.3–5)
ALP SERPL-CCNC: 65 U/L — SIGNIFICANT CHANGE UP (ref 40–120)
ALT FLD-CCNC: 20 U/L — SIGNIFICANT CHANGE UP (ref 4–41)
AMMONIA BLD-MCNC: 48 UMOL/L — SIGNIFICANT CHANGE UP (ref 11–55)
ANION GAP SERPL CALC-SCNC: 12 MMOL/L — SIGNIFICANT CHANGE UP (ref 7–14)
AST SERPL-CCNC: 17 U/L — SIGNIFICANT CHANGE UP (ref 4–40)
B BURGDOR C6 AB SER-ACNC: NEGATIVE — SIGNIFICANT CHANGE UP
B BURGDOR IGG+IGM SER-ACNC: 0.04 INDEX — SIGNIFICANT CHANGE UP (ref 0.01–0.9)
BILIRUB SERPL-MCNC: 0.4 MG/DL — SIGNIFICANT CHANGE UP (ref 0.2–1.2)
BUN SERPL-MCNC: 14 MG/DL — SIGNIFICANT CHANGE UP (ref 7–23)
CALCIUM SERPL-MCNC: 8.9 MG/DL — SIGNIFICANT CHANGE UP (ref 8.4–10.5)
CHLORIDE SERPL-SCNC: 106 MMOL/L — SIGNIFICANT CHANGE UP (ref 98–107)
CK SERPL-CCNC: 181 U/L — SIGNIFICANT CHANGE UP (ref 30–200)
CO2 SERPL-SCNC: 21 MMOL/L — LOW (ref 22–31)
CREAT SERPL-MCNC: 0.73 MG/DL — SIGNIFICANT CHANGE UP (ref 0.5–1.3)
CRP SERPL-MCNC: <3 MG/L — SIGNIFICANT CHANGE UP
EGFR: 134 ML/MIN/1.73M2 — SIGNIFICANT CHANGE UP
FLUAV AG NPH QL: SIGNIFICANT CHANGE UP
FLUBV AG NPH QL: SIGNIFICANT CHANGE UP
FOLATE SERPL-MCNC: 8.2 NG/ML — SIGNIFICANT CHANGE UP (ref 3.1–17.5)
GAS PNL BLDV: SIGNIFICANT CHANGE UP
GLUCOSE BLDC GLUCOMTR-MCNC: 132 MG/DL — HIGH (ref 70–99)
GLUCOSE SERPL-MCNC: 138 MG/DL — HIGH (ref 70–99)
MAGNESIUM SERPL-MCNC: 2.1 MG/DL — SIGNIFICANT CHANGE UP (ref 1.6–2.6)
PHOSPHATE SERPL-MCNC: 4 MG/DL — SIGNIFICANT CHANGE UP (ref 2.5–4.5)
POTASSIUM SERPL-MCNC: 3.8 MMOL/L — SIGNIFICANT CHANGE UP (ref 3.5–5.3)
POTASSIUM SERPL-SCNC: 3.8 MMOL/L — SIGNIFICANT CHANGE UP (ref 3.5–5.3)
PROT SERPL-MCNC: 6.2 G/DL — SIGNIFICANT CHANGE UP (ref 6–8.3)
RSV RNA NPH QL NAA+NON-PROBE: SIGNIFICANT CHANGE UP
SARS-COV-2 RNA SPEC QL NAA+PROBE: SIGNIFICANT CHANGE UP
SODIUM SERPL-SCNC: 139 MMOL/L — SIGNIFICANT CHANGE UP (ref 135–145)
T3FREE SERPL-MCNC: 2.54 PG/ML — SIGNIFICANT CHANGE UP (ref 2–4.4)
T4 FREE SERPL-MCNC: 1.1 NG/DL — SIGNIFICANT CHANGE UP (ref 0.9–1.8)
TSH SERPL-MCNC: 3.97 UIU/ML — SIGNIFICANT CHANGE UP (ref 0.27–4.2)
VIT B12 SERPL-MCNC: 212 PG/ML — SIGNIFICANT CHANGE UP (ref 200–900)

## 2024-12-03 PROCEDURE — 95720 EEG PHY/QHP EA INCR W/VEEG: CPT

## 2024-12-03 PROCEDURE — 99223 1ST HOSP IP/OBS HIGH 75: CPT

## 2024-12-03 PROCEDURE — 99254 IP/OBS CNSLTJ NEW/EST MOD 60: CPT

## 2024-12-03 RX ORDER — CHLORHEXIDINE GLUCONATE 1.2 MG/ML
1 RINSE ORAL
Refills: 0 | Status: DISCONTINUED | OUTPATIENT
Start: 2024-12-03 | End: 2024-12-11

## 2024-12-03 RX ORDER — CEFTRIAXONE SODIUM 1 G
1000 VIAL (EA) INJECTION EVERY 24 HOURS
Refills: 0 | Status: DISCONTINUED | OUTPATIENT
Start: 2024-12-03 | End: 2024-12-07

## 2024-12-03 RX ORDER — MAGNESIUM, ALUMINUM HYDROXIDE 200-225/5
30 SUSPENSION, ORAL (FINAL DOSE FORM) ORAL EVERY 4 HOURS
Refills: 0 | Status: DISCONTINUED | OUTPATIENT
Start: 2024-12-03 | End: 2024-12-11

## 2024-12-03 RX ORDER — LORAZEPAM 2 MG/1
2 TABLET ORAL EVERY 8 HOURS
Refills: 0 | Status: DISCONTINUED | OUTPATIENT
Start: 2024-12-03 | End: 2024-12-03

## 2024-12-03 RX ORDER — ACETAMINOPHEN 500MG 500 MG/1
650 TABLET, COATED ORAL EVERY 6 HOURS
Refills: 0 | Status: DISCONTINUED | OUTPATIENT
Start: 2024-12-03 | End: 2024-12-11

## 2024-12-03 RX ORDER — ACETAMINOPHEN 500MG 500 MG/1
1000 TABLET, COATED ORAL ONCE
Refills: 0 | Status: COMPLETED | OUTPATIENT
Start: 2024-12-03 | End: 2024-12-03

## 2024-12-03 RX ORDER — LORAZEPAM 2 MG/1
1 TABLET ORAL ONCE
Refills: 0 | Status: DISCONTINUED | OUTPATIENT
Start: 2024-12-03 | End: 2024-12-03

## 2024-12-03 RX ORDER — ACETAMINOPHEN, DIPHENHYDRAMINE HCL, PHENYLEPHRINE HCL 325; 25; 5 MG/1; MG/1; MG/1
3 TABLET ORAL AT BEDTIME
Refills: 0 | Status: DISCONTINUED | OUTPATIENT
Start: 2024-12-03 | End: 2024-12-11

## 2024-12-03 RX ORDER — ONDANSETRON HYDROCHLORIDE 4 MG/1
4 TABLET, FILM COATED ORAL EVERY 8 HOURS
Refills: 0 | Status: DISCONTINUED | OUTPATIENT
Start: 2024-12-03 | End: 2024-12-11

## 2024-12-03 RX ORDER — AZITHROMYCIN 250 MG/1
500 TABLET, FILM COATED ORAL EVERY 24 HOURS
Refills: 0 | Status: COMPLETED | OUTPATIENT
Start: 2024-12-03 | End: 2024-12-05

## 2024-12-03 RX ADMIN — LORAZEPAM 1 MILLIGRAM(S): 2 TABLET ORAL at 23:59

## 2024-12-03 RX ADMIN — ACETAMINOPHEN 500MG 400 MILLIGRAM(S): 500 TABLET, COATED ORAL at 02:00

## 2024-12-03 RX ADMIN — Medication 100 MILLIGRAM(S): at 17:25

## 2024-12-03 RX ADMIN — AZITHROMYCIN 255 MILLIGRAM(S): 250 TABLET, FILM COATED ORAL at 17:25

## 2024-12-03 RX ADMIN — LORAZEPAM 1 MILLIGRAM(S): 2 TABLET ORAL at 23:57

## 2024-12-03 RX ADMIN — CHLORHEXIDINE GLUCONATE 1 APPLICATION(S): 1.2 RINSE ORAL at 17:22

## 2024-12-03 RX ADMIN — ACETAMINOPHEN 500MG 650 MILLIGRAM(S): 500 TABLET, COATED ORAL at 10:18

## 2024-12-03 NOTE — CHART NOTE - NSCHARTNOTEFT_GEN_A_CORE
EEG preliminary read (not final) on the initial recording hour(s) = >1.5 hr    Normal EEG in the awake, drowsy, asleep states.  No epileptiform abnormalities or seizures.    Final report to follow tomorrow morning after completion of study.    EEG Reading Room Ph#: [phone]  Epilepsy Answering Service after 5PM and before 8:30AM: Ph#: [phone]

## 2024-12-03 NOTE — H&P ADULT - HISTORY OF PRESENT ILLNESS
19M hx schizophrenia vs. bipolar disorder, previously on Zyprexa (unknown dose, ran out 10 days ago), presents with headache x3 days. Patient is poor historian, but reports sudden onset occipital-area headache radiating to top of head. Reported unclear visual disturbance to neuro, denies during my exam. Visited ED on 12/2, was discharged after headache self-resolved. Headache since worsened, and he had an episode of "collapse" while walking, w/o headstrike, associated wth 2-3 minute period of reduced consciousness/awareness. Small amount of urine leakage during this episode.

## 2024-12-03 NOTE — ED PROCEDURE NOTE - PROCEDURE ADDITIONAL DETAILS
Two attempts made.  Patient requested to stop procedure 2/2 pain despite lidocaine administration.  Procedure stopped without CSF fluid obtained.

## 2024-12-03 NOTE — PHARMACOTHERAPY INTERVENTION NOTE - COMMENTS
Medication history is incomplete. Unable to verify patient's medication list with two sources. Please call spectra [phone] if you have any questions. Patient/Family not sure of Zyprexa dose.  Spoke to family member to confirm pharmacy.  Called [facility], and they had no records for the patient.

## 2024-12-03 NOTE — H&P ADULT - ATTENDING COMMENTS
agree w/ above  likely psychiatric component to AMS, r/o seizure w/ EEG  can obtain MRI head w/ w/o iv contrast r/o other causes of encephalopathy  wbc count 22k, low concern meningitis afebrile, procal negative, no neck stiffness. Differential not impressive  -can obtain LP to mainly evaluate protein/AI encephalopathy w/u  -empirically tx w/ ceftriaxone 1 gram Q24 and azithromycin 500 mg  -if repeat CBC w/ worsening leukocytosis, or patient develops confusion/neck stiffness then would empirically treat meningitis. agree w/ above  likely psychiatric component to AMS, r/o seizure w/ EEG  can obtain MRI head w/ w/o iv contrast r/o other causes of encephalopathy  wbc count 22k, low concern meningitis afebrile, procal negative, no neck stiffness. Differential not impressive  -can obtain LP to mainly evaluate protein/AI encephalopathy w/u  -empirically tx w/ ceftriaxone 1 gram Q24 and azithromycin 500 mg  -if repeat CBC w/ worsening leukocytosis, or patient develops confusion/neck stiffness then would empirically treat meningitis.  -ED unable to get LP can get neurorads agree w/ above  likely psychiatric component to AMS, r/o seizure w/ EEG  can obtain MRI head w/ w/o iv contrast r/o other causes of encephalopathy  wbc count 22k, low concern meningitis afebrile, procal negative, no neck stiffness. Differential not impressive  -can obtain LP to mainly evaluate protein/AI encephalopathy w/u  -empirically tx w/ ceftriaxone 1 gram Q24 and azithromycin 500 mg  -if repeat CBC w/ worsening leukocytosis, or patient develops confusion/neck stiffness/fevers then would empirically treat meningitis.  check syphilis, Lyme,   -ED unable to get LP can get neurorads

## 2024-12-03 NOTE — H&P ADULT - PROBLEM SELECTOR PLAN 1
- neuro following, requested workup pending  - 24hr VEEG, MR Brain w/wo ordered, pending  - procedure team c/s in am to re-attempt LP  - will hold off on empiric meningitis treatment, would start empiric viral + antibacterial coverage should patient become toxic-appearing  - possibly zyprexa withdrawal? similar presentation in 09/2024

## 2024-12-03 NOTE — ED ADULT NURSE REASSESSMENT NOTE - NS ED NURSE REASSESS COMMENT FT1
pt c/o 1x episode urinary incontinence, no seizure activity or other LOC.  pt hypotensive, liter bolus administered. pain medicine administered. pt c/o 1x episode urinary incontinence, no seizure activity or other LOC.  pt hypotensive, liter bolus administered. pt c/o 1x episode urinary incontinence, no seizure activity or other LOC.  pt hypotensive, liter bolus administered per verbal order MD Dillon. medicated for pain. PCA at bedside for patient care.

## 2024-12-03 NOTE — ED ADULT NURSE REASSESSMENT NOTE - GENERAL PATIENT STATE
comfortable appearance/family/SO at bedside
comfortable appearance/resting/sleeping
comfortable appearance/family/SO at bedside/resting/sleeping

## 2024-12-03 NOTE — H&P ADULT - ASSESSMENT
19M hx schizophrenia vs. bipolar vs. schizoaff, on Zyprexa but recently ran out, comes to ED for waxing-waning headache. Neuro initially concerned for structural vs. infectious vs. metabolic vs. epileptic cause, pt unable to tolerate LP in ED due to pain. Less concerning for meningitis is elapsing-remitting nature, lack of photosensitivity or nuchal rigidity, and presence of similar headache in 09/2024 after stopping zyprexa.

## 2024-12-03 NOTE — H&P ADULT - NSHPPHYSICALEXAM_GEN_ALL_CORE
ICU Vital Signs Last 24 Hrs  T(C): 37.1 (03 Dec 2024 03:20), Max: 37.1 (03 Dec 2024 03:20)  T(F): 98.7 (03 Dec 2024 03:20), Max: 98.7 (03 Dec 2024 03:20)  HR: 77 (03 Dec 2024 03:20) (74 - 94)  BP: 131/66 (03 Dec 2024 03:20) (85/58 - 146/86)  BP(mean): --  ABP: --  ABP(mean): --  RR: 17 (03 Dec 2024 03:20) (14 - 24)  SpO2: 98% (03 Dec 2024 03:20) (98% - 100%)    O2 Parameters below as of 03 Dec 2024 03:20  Patient On (Oxygen Delivery Method): room air

## 2024-12-04 DIAGNOSIS — R56.9 UNSPECIFIED CONVULSIONS: ICD-10-CM

## 2024-12-04 LAB
ALBUMIN SERPL ELPH-MCNC: 4.6 G/DL — SIGNIFICANT CHANGE UP (ref 3.3–5)
ALP SERPL-CCNC: 84 U/L — SIGNIFICANT CHANGE UP (ref 40–120)
ALT FLD-CCNC: 29 U/L — SIGNIFICANT CHANGE UP (ref 4–41)
ANION GAP SERPL CALC-SCNC: 18 MMOL/L — HIGH (ref 7–14)
APTT BLD: 35.8 SEC — HIGH (ref 24.5–35.6)
AST SERPL-CCNC: 23 U/L — SIGNIFICANT CHANGE UP (ref 4–40)
BILIRUB SERPL-MCNC: 0.4 MG/DL — SIGNIFICANT CHANGE UP (ref 0.2–1.2)
BUN SERPL-MCNC: 14 MG/DL — SIGNIFICANT CHANGE UP (ref 7–23)
CALCIUM SERPL-MCNC: 9.8 MG/DL — SIGNIFICANT CHANGE UP (ref 8.4–10.5)
CHLORIDE SERPL-SCNC: 102 MMOL/L — SIGNIFICANT CHANGE UP (ref 98–107)
CO2 SERPL-SCNC: 19 MMOL/L — LOW (ref 22–31)
CREAT SERPL-MCNC: 0.73 MG/DL — SIGNIFICANT CHANGE UP (ref 0.5–1.3)
EGFR: 134 ML/MIN/1.73M2 — SIGNIFICANT CHANGE UP
GLUCOSE SERPL-MCNC: 130 MG/DL — HIGH (ref 70–99)
HCT VFR BLD CALC: 47.5 % — SIGNIFICANT CHANGE UP (ref 39–50)
HGB BLD-MCNC: 15.5 G/DL — SIGNIFICANT CHANGE UP (ref 13–17)
INR BLD: 0.99 RATIO — SIGNIFICANT CHANGE UP (ref 0.85–1.16)
MAGNESIUM SERPL-MCNC: 2 MG/DL — SIGNIFICANT CHANGE UP (ref 1.6–2.6)
MCHC RBC-ENTMCNC: 27.7 PG — SIGNIFICANT CHANGE UP (ref 27–34)
MCHC RBC-ENTMCNC: 32.6 G/DL — SIGNIFICANT CHANGE UP (ref 32–36)
MCV RBC AUTO: 84.8 FL — SIGNIFICANT CHANGE UP (ref 80–100)
NRBC # BLD: 0 /100 WBCS — SIGNIFICANT CHANGE UP (ref 0–0)
NRBC # FLD: 0 K/UL — SIGNIFICANT CHANGE UP (ref 0–0)
PHOSPHATE SERPL-MCNC: 3.7 MG/DL — SIGNIFICANT CHANGE UP (ref 2.5–4.5)
PLATELET # BLD AUTO: 277 K/UL — SIGNIFICANT CHANGE UP (ref 150–400)
POTASSIUM SERPL-MCNC: 4 MMOL/L — SIGNIFICANT CHANGE UP (ref 3.5–5.3)
POTASSIUM SERPL-SCNC: 4 MMOL/L — SIGNIFICANT CHANGE UP (ref 3.5–5.3)
PROLACTIN SERPL-MCNC: 34 NG/ML — HIGH (ref 4.1–18.4)
PROT SERPL-MCNC: 7.8 G/DL — SIGNIFICANT CHANGE UP (ref 6–8.3)
PROTHROM AB SERPL-ACNC: 11.5 SEC — SIGNIFICANT CHANGE UP (ref 9.9–13.4)
RBC # BLD: 5.6 M/UL — SIGNIFICANT CHANGE UP (ref 4.2–5.8)
RBC # FLD: 12.7 % — SIGNIFICANT CHANGE UP (ref 10.3–14.5)
RPR SER-TITR: SIGNIFICANT CHANGE UP
RPR SERPL-ACNC: SIGNIFICANT CHANGE UP
SODIUM SERPL-SCNC: 139 MMOL/L — SIGNIFICANT CHANGE UP (ref 135–145)
T PALLIDUM AB TITR SER: POSITIVE
T PALLIDUM IGG SER QL IF: REACTIVE
WBC # BLD: 13.17 K/UL — HIGH (ref 3.8–10.5)
WBC # FLD AUTO: 13.17 K/UL — HIGH (ref 3.8–10.5)

## 2024-12-04 PROCEDURE — 62270 DX LMBR SPI PNXR: CPT | Mod: GC

## 2024-12-04 PROCEDURE — 99233 SBSQ HOSP IP/OBS HIGH 50: CPT | Mod: 25

## 2024-12-04 PROCEDURE — 70450 CT HEAD/BRAIN W/O DYE: CPT | Mod: 26

## 2024-12-04 PROCEDURE — 99223 1ST HOSP IP/OBS HIGH 75: CPT

## 2024-12-04 RX ORDER — LORAZEPAM 2 MG/1
1 TABLET ORAL ONCE
Refills: 0 | Status: DISCONTINUED | OUTPATIENT
Start: 2024-12-04 | End: 2024-12-04

## 2024-12-04 RX ADMIN — Medication 100 MILLIGRAM(S): at 19:47

## 2024-12-04 RX ADMIN — CHLORHEXIDINE GLUCONATE 1 APPLICATION(S): 1.2 RINSE ORAL at 05:08

## 2024-12-04 RX ADMIN — ACETAMINOPHEN 500MG 650 MILLIGRAM(S): 500 TABLET, COATED ORAL at 13:43

## 2024-12-04 RX ADMIN — AZITHROMYCIN 255 MILLIGRAM(S): 250 TABLET, FILM COATED ORAL at 19:47

## 2024-12-04 RX ADMIN — Medication 4 MILLIGRAM(S): at 11:19

## 2024-12-04 RX ADMIN — LORAZEPAM 1 MILLIGRAM(S): 2 TABLET ORAL at 09:13

## 2024-12-04 NOTE — PROGRESS NOTE ADULT - PROBLEM SELECTOR PLAN 3
- [facility] emailed for med rec  - resume zyprexa when dose is determined  - f/u  recommendations

## 2024-12-04 NOTE — BH CONSULTATION LIAISON ASSESSMENT NOTE - HPI (INCLUDE ILLNESS QUALITY, SEVERITY, DURATION, TIMING, CONTEXT, MODIFYING FACTORS, ASSOCIATED SIGNS AND SYMPTOMS)
19M hx schizophrenia vs. bipolar disorder, previously on Zyprexa (unknown dose, ran out 10 days ago), presents with headache x3 days. Patient is poor historian, but reports sudden onset occipital-area headache radiating to top of head. Reported unclear visual disturbance to neuro, denies during my exam. Visited ED on 12/2, was discharged after headache self-resolved. Headache since worsened, and he had an episode of "collapse" while walking, w/o headstrike, associated wth 2-3 minute period of reduced consciousness/awareness. Small amount of urine leakage during this episode.    Overnight pt had an episode of unresponsiveness for which an RRT was called. According to notes pt was not responding to commands for a period of time but began improving shortly after. Of note pt is primarily Maira speaking with limited English ability. Pt was on EEG at the time which did not demonstrate any epileptiform activity or seizures. Psychiatry consulted for possible catatonia given pt episode of unresponsiveness.    12/4 - pt assessed at bedside today with assistance of [facility] . pt's brother was on the phone for a brief portion of the interview. On exam pt is appropriately answering questions with grossly linear thought process. Pt states he still has an intermittent occipital headache, especially when he puts his hair up into a bun. Pt states that he has episodes of a headache in the back of his head followed by a sensation of "his tongue being stretched backward in his mouth" and that these episodes are associated with urinary incontinence. Pt is not rigid and does not exhibit any mannerisms, reduced speech, or symptoms consistent with social withdrawal.    Of note pt is uncertain of his previous psychiatric diagnosis and the reason for which he takes Zyprexa. He also reports he is not fully confident that the medication which he takes is, in fact, called Zyprexa and endorses that it may be something different. He was initially prescribed this medication when he presented to [facility] in [place] several months ago for a similar episode to this week's presentation. Denies ever having auditory hallucinations or periods of time in which he has significantly elevated mood/increased energy/decreased need for sleep.

## 2024-12-04 NOTE — BH CONSULTATION LIAISON ASSESSMENT NOTE - ADDITIONAL DETAILS / COMMENTS
Tone: cogwheel rigidity [ ], hypertonus [ ], atonia [ ], paratonia: gegenhalten [ ] mitgehen [ ]  Movement: tremor - static [ ] intentional [ ], dysmetria [ ], dysdiadochokinesia [ ]  Reflexes: hyperreflexia [ ], myoclonus [ ], hyporeflexia [ ], primitive reflexes [ ]  Speech: aphasia: motor (expressive)  [ ] sensory (receptive) [ ] conduction aphasia [ ] global aphasia [ ]

## 2024-12-04 NOTE — BH CONSULTATION LIAISON ASSESSMENT NOTE - NSBHCHARTREVIEWVS_PSY_A_CORE FT
Vital Signs Last 24 Hrs  T(C): 36.4 (04 Dec 2024 04:45), Max: 37.4 (04 Dec 2024 00:34)  T(F): 97.5 (04 Dec 2024 04:45), Max: 99.4 (04 Dec 2024 00:34)  HR: 66 (04 Dec 2024 04:45) (66 - 102)  BP: 114/55 (04 Dec 2024 04:45) (113/52 - 135/77)  BP(mean): --  RR: 18 (04 Dec 2024 04:45) (18 - 18)  SpO2: 99% (04 Dec 2024 04:45) (98% - 99%)    Parameters below as of 04 Dec 2024 04:45  Patient On (Oxygen Delivery Method): room air

## 2024-12-04 NOTE — BH CONSULTATION LIAISON ASSESSMENT NOTE - CURRENT MEDICATION
MEDICATIONS  (STANDING):  azithromycin  IVPB 500 milliGRAM(s) IV Intermittent every 24 hours  cefTRIAXone   IVPB 1000 milliGRAM(s) IV Intermittent every 24 hours  chlorhexidine 2% Cloths 1 Application(s) Topical <User Schedule>    MEDICATIONS  (PRN):  acetaminophen     Tablet .. 650 milliGRAM(s) Oral every 6 hours PRN Temp greater or equal to 38C (100.4F), Mild Pain (1 - 3)  aluminum hydroxide/magnesium hydroxide/simethicone Suspension 30 milliLiter(s) Oral every 4 hours PRN Dyspepsia  LORazepam   Injectable 2 milliGRAM(s) IV Push every 8 hours PRN seizure >3 minutes  melatonin 3 milliGRAM(s) Oral at bedtime PRN Insomnia  ondansetron Injectable 4 milliGRAM(s) IV Push every 8 hours PRN Nausea and/or Vomiting

## 2024-12-04 NOTE — RAPID RESPONSE TEAM SUMMARY - NSMEDICATIONSRRT_GEN_ALL_CORE
-1mg ativan x2  -recommend 1mg ativan q8h for likely catatonia  -consider repeat CTH, john. if w/ persistent hypertension  -f/u CBC, CMP, prolactin, coags  -recommend psych eval in AM

## 2024-12-04 NOTE — BH CONSULTATION LIAISON ASSESSMENT NOTE - NSBHCHARTREVIEWLAB_PSY_A_CORE FT
15.5   13.17 )-----------( 277      ( 04 Dec 2024 00:05 )             47.5       12-04    139  |  102  |  14  ----------------------------<  130[H]  4.0   |  19[L]  |  0.73    Ca    9.8      04 Dec 2024 00:05  Phos  3.7     12-04  Mg     2.00     12-04    TPro  7.8  /  Alb  4.6  /  TBili  0.4  /  DBili  x   /  AST  23  /  ALT  29  /  AlkPhos  84  12-04              Urinalysis Basic - ( 04 Dec 2024 00:05 )    Color: x / Appearance: x / SG: x / pH: x  Gluc: 130 mg/dL / Ketone: x  / Bili: x / Urobili: x   Blood: x / Protein: x / Nitrite: x   Leuk Esterase: x / RBC: x / WBC x   Sq Epi: x / Non Sq Epi: x / Bacteria: x        PT/INR - ( 03 Dec 2024 23:55 )   PT: 11.5 sec;   INR: 0.99 ratio         PTT - ( 03 Dec 2024 23:55 )  PTT:35.8 sec          CAPILLARY BLOOD GLUCOSE      POCT Blood Glucose.: 132 mg/dL (03 Dec 2024 23:43)

## 2024-12-04 NOTE — PROGRESS NOTE ADULT - SUBJECTIVE AND OBJECTIVE BOX
[facility] Division of Hospital Medicine  Britt Zavala MD   Available on TEAMS      Overnight,   RRT was called for unresponsiveness. possibly catatonic state. EEG without seizure at the time of event.  Pt was evaluated this am. No change in symptoms.   Unsuccessful LP today, per further recommendations by neuro, no LP needed. Low concern for meningitis. No seizures on EEG.  Neurology rec.  eval.    MEDICATIONS  (STANDING):  azithromycin  IVPB 500 milliGRAM(s) IV Intermittent every 24 hours  cefTRIAXone   IVPB 1000 milliGRAM(s) IV Intermittent every 24 hours  chlorhexidine 2% Cloths 1 Application(s) Topical <User Schedule>    MEDICATIONS  (PRN):  acetaminophen     Tablet .. 650 milliGRAM(s) Oral every 6 hours PRN Temp greater or equal to 38C (100.4F), Mild Pain (1 - 3)  aluminum hydroxide/magnesium hydroxide/simethicone Suspension 30 milliLiter(s) Oral every 4 hours PRN Dyspepsia  LORazepam   Injectable 2 milliGRAM(s) IV Push every 8 hours PRN seizure >3 minutes  melatonin 3 milliGRAM(s) Oral at bedtime PRN Insomnia  ondansetron Injectable 4 milliGRAM(s) IV Push every 8 hours PRN Nausea and/or Vomiting    Vital Signs Last 24 Hrs  T(C): 36.5 (04 Dec 2024 13:40), Max: 37.4 (04 Dec 2024 00:34)  T(F): 97.7 (04 Dec 2024 13:40), Max: 99.4 (04 Dec 2024 00:34)  HR: 84 (04 Dec 2024 13:40) (66 - 102)  BP: 147/77 (04 Dec 2024 13:40) (113/52 - 147/77)  BP(mean): --  RR: 18 (04 Dec 2024 13:40) (18 - 18)  SpO2: 100% (04 Dec 2024 13:40) (98% - 100%)    Parameters below as of 04 Dec 2024 13:40  Patient On (Oxygen Delivery Method): room air        CONSTITUTIONAL: NAD, well-groomed  EYES: PERRLA; conjunctiva and sclera clear  ENMT: Moist oral mucosa, no pharyngeal injection or exudates; normal dentition  NECK: Supple, no palpable masses; no thyromegaly  RESPIRATORY: Normal respiratory effort; lungs are clear to auscultation bilaterally  CARDIOVASCULAR: Regular rate and rhythm, normal S1 and S2, no murmur/rub/gallop; No lower extremity edema; Peripheral pulses are 2+ bilaterally  ABDOMEN: Nontender to palpation, normoactive bowel sounds, no rebound/guarding; No hepatosplenomegaly  MUSCULOSKELETAL:  Normal gait; no clubbing or cyanosis of digits; no joint swelling or tenderness to palpation  PSYCH: A+O to person, place, and time; affect appropriate  NEUROLOGY: CN 2-12 are intact and symmetric; no gross sensory deficits   SKIN: No rashes; no palpable lesions

## 2024-12-04 NOTE — BH CONSULTATION LIAISON ASSESSMENT NOTE - NSBHATTESTCOMMENTATTENDFT_PSY_A_CORE
Chart reviewed. Seen briefly with student; pt was not catatonic, speaking freely, moving all extremities calmly and purposefully, tracking and engaging appropriately. Some impaired insight/disorganization noted as above; historical information incomplete. Agree with above assessment/recs. Will continue to follow and assess

## 2024-12-04 NOTE — BH CONSULTATION LIAISON ASSESSMENT NOTE - SUMMARY
20yo man with a possible previous psychiatric diagnosis of schizoaffective disorder or bipolar disorder or schizophrenia, possibly on Zyprexa vs a different medication, presenting with occipital headache, episode of collapsing while walking associated with confusion lasting 2-3 minutes and slight urinary incontinence. Pt's first episode several months ago led him to present to [facility] where he states he was prescribed a medication which he was taking regularly up until ten days ago when he ran out; he is unclear on what his diagnosis was at the time and is also unclear on whether the medication which he was prescribed is called Zyprexa or something else. Psych consulted for episode of unresponsiveness possibly c/f catatonia.     Given pt responsiveness, cooperation, and lack of rigidity on exam today as well as questionable previous psych dx, low concern for catatonia at this time.     PLAN:  -Further workup as per neuro team, appreciate recs  -No role for 1:1 CO at this time  -No role for psychotropic medications at this time   -All recommendations not final pending attending attestation 18yo man with a possible previous psychiatric diagnosis of schizoaffective disorder or bipolar disorder or schizophrenia, possibly on Zyprexa vs a different medication, presenting with occipital headache, episode of collapsing while walking associated with confusion lasting 2-3 minutes and slight urinary incontinence. Pt's first episode several months ago led him to present to [facility] where he states he was prescribed a medication which he was taking regularly up until ten days ago when he ran out; he is unclear on what his diagnosis was at the time and is also unclear on whether the medication which he was prescribed is called Zyprexa or something else. Psych consulted for episode of unresponsiveness possibly c/f catatonia.     Given pt responsiveness, cooperation, and lack of rigidity on exam today as well as questionable previous psych dx, low concern for catatonia at this time.     PLAN:  -Further workup as per neuro team, appreciate recs  -No role for 1:1 CO at this time  -No role for psychotropic medications at this time   - Agitation: Ativan 1mg PO/IM/IV q 6 PRN  -All recommendations not final pending attending attestation

## 2024-12-04 NOTE — RAPID RESPONSE TEAM SUMMARY - NSSITUATIONBACKGROUNDRRT_GEN_ALL_CORE
19y M hx schizophrenia vs. bipolar vs. schizoaff, on Zyprexa but recently ran out admitted for occipital headache - negative head imaging to date, current considerations for zyprexa withdrawal. RRT called for unresponsiveness.     Upon arrival, VS: T ~98, -180 systolic, O2 100% on RA, HR ~100. On exam, pt moving no extremities, tracking with eyes. Pupils ERRLA. CBC, CMP, prolactin, coagulation studies sent. Initial concern for nonepileptic seizure (however pt conscious and tracking) vs pontine stroke as pt only tracking, however given young age, minimal risk factors, and clinical context, larger consideration for catatonia. Trialed w/ 1mg ativan prior to consideration for head CT and pt was then opening mouth on command and able to nod yes/no, lowering concern for stroke.  19y M hx schizophrenia vs. bipolar vs. schizoaff, on Zyprexa but recently ran out admitted for occipital headache - negative head imaging to date, current considerations for zyprexa withdrawal. RRT called for unresponsiveness.     Upon arrival, VS: T ~98, -180 systolic, O2 100% on RA, HR ~100. On exam, pt moving no extremities, tracking with eyes. Pupils ERRLA. CBC, CMP, prolactin, coagulation studies sent. Initial concern for nonepileptic seizure (however pt conscious and tracking) vs pontine stroke as pt only tracking, however given young age, minimal risk factors, and clinical context, larger consideration for catatonia. Trialed w/ 1mg ativan prior to consideration for head CT and pt improved, opening mouth on command and able to nod yes/no, lowering concern for stroke. Given additional 1mg ativan prior to termination of RRT.  19y M hx schizophrenia vs. bipolar vs. schizoaff, on Zyprexa but recently ran out admitted for occipital headache - negative head imaging to date, current considerations for zyprexa withdrawal. RRT called for unresponsiveness.     Upon arrival, VS: T ~98, -180 systolic, O2 100% on RA, HR ~100. On exam, pt moving no extremities, tracking with eyes. Pupils ERRLA. CBC, CMP, prolactin, coagulation studies sent. Initial concern for nonepileptic seizure (however pt conscious and tracking) vs pontine stroke as pt only tracking, however given young age, minimal risk factors, and clinical context, larger consideration for catatonia. Trialed w/ 1mg ativan prior to consideration for head CT and pt improved, opening mouth on command and able to nod yes/no, greatly lowering concern for stroke. Given additional 1mg ativan prior to termination of RRT.  19y M hx schizophrenia vs. bipolar vs. schizoaff, on Zyprexa but recently ran out admitted for occipital headache - negative head imaging to date, current considerations for zyprexa withdrawal. RRT called for unresponsiveness.     Upon arrival, VS: T ~98, -180 systolic, O2 100% on RA, HR ~100. On exam, pt moving no extremities, tracking with eyes. Pupils ERRLA. CBC, CMP, prolactin, coagulation studies sent. Initial concern for nonepileptic seizure (however pt conscious and tracking) vs pontine stroke as pt only tracking, however given young age, minimal risk factors, and clinical context, larger consideration for catatonia. Trialed w/ 1mg ativan prior to consideration for head CT and pt improved, more interactive, opening mouth on command and able to nod yes/no, greatly lowering concern for stroke. Given additional 1mg ativan prior to termination of RRT.

## 2024-12-04 NOTE — PROCEDURE NOTE - NSINFORMCONSENT_GEN_A_CORE
Benefits, risks, and possible complications of procedure explained to patient/caregiver who verbalized understanding and gave written consent. 120 (3) adequate

## 2024-12-04 NOTE — PROCEDURE NOTE - ADDITIONAL PROCEDURE DETAILS
LP Attempted on patient on 2 spinal levels, despite extensive numbing with 1% lidocaine of 20cc and 4mg IV morphine, pt unable to tolerate procedure secondary to pain. Unable to obtain spinal fluid. Recommend Neuro-IR consultation for sedated LP.

## 2024-12-04 NOTE — EEG REPORT - NS EEG TEXT BOX
PEBBLES FRY [id number]     Study Date: 12/3/24 12:47 - 12/4/24 08:00  Duration: 19 hr 1 min  --------------------------------------------------------------------------------------------------  History:  CC/ HPI Patient is a 19y old  Male who presents with a chief complaint of possible seizure (03 Dec 2024 04:11)    MEDICATIONS  (STANDING):  azithromycin  IVPB 500 milliGRAM(s) IV Intermittent every 24 hours  cefTRIAXone   IVPB 1000 milliGRAM(s) IV Intermittent every 24 hours  chlorhexidine 2% Cloths 1 Application(s) Topical <User Schedule>    --------------------------------------------------------------------------------------------------  Study Interpretation:    [[[Abbreviation Key:  PDR=alpha rhythm/posterior dominant rhythm. A-P=anterior posterior.  Amplitude: ‘very low’:<20; ‘low’:20-49; ‘medium’:; ‘high’:>150uV.  Persistence for periodic/rhythmic patterns (% of epoch) ‘rare’:<1%; ‘occasional’:1-10%; ‘frequent’:10-50%; ‘abundant’:50-90%; ‘continuous’:>90%.  Persistence for sporadic discharges: ‘rare’:<1/hr; ‘occasional’:1/min-1/hr; ‘frequent’:>1/min; ‘abundant’:>1/10 sec.  RPP=rhythmic and periodic patterns; GRDA=generalized rhythmic delta activity; FIRDA=frontal intermittent GRDA; LRDA=lateralized rhythmic delta activity; TIRDA=temporal intermittent rhythmic delta activity;  LPD=PLED=lateralized periodic discharges; GPD=generalized periodic discharges; BIPDs =bilateral independent periodic discharges; Mf=multifocal; SIRPDs=stimulus induced rhythmic, periodic, or ictal appearing discharges; BIRDs=brief potentially ictal rhythmic discharges >4 Hz, lasting .5-10s; PFA (paroxysmal bursts >13 Hz or =8 Hz <10s).  Modifiers: +F=with fast component; +S=with spike component; +R=with rhythmic component.  S-B=burst suppression pattern.  Max=maximal. N1-drowsy; N2-stage II sleep; N3-slow wave sleep. SSS/BETS=small sharp spikes/benign epileptiform transients of sleep. HV=hyperventilation; PS=photic stimulation]]]    Daily EEG Visual Analysis    FINDINGS:      Background:  Continuity: Continuous  Symmetry: Symmetric  Posterior dominant rhythm (PDR): 10 Hz, reactive to eye closure. Symmetric low-amplitude frontal beta in wakefulness.  Voltage: Normal  Anterior-Posterior Gradient: Present  Other background findings: None  Breach: Absent    Background Slowing:  Generalized slowing: None  Focal slowing: None    State Changes:   Drowsiness is characterized by fragmentation, attenuation, and slowing of the background activity.  Stage 2 sleep is characterized by symmetric K complexes and sleep spindles.     Interictal Findings:  None    Electrographic and Electroclinical seizures:  None    Other Clinical Events:  00:05: Event of unresponsiveness. Per chart, patient tracking but not following commands or moving extremities, improved after Ativan 1 mg for possible catatonia. EEG is uninterpretable at this time.    Activation Procedures:   Hyperventilation is not performed.    Photic stimulation is not performed.    Artifacts:  Intermittent myogenic and movement artifacts are present. After the first 8 hr 23 min of recording (around 21:10), interpretation becomes progressively more limited due to movement artifact, multiple disconnected electrodes, and poor electrode impedance. The study is very limited and intermittently uninterpretable after 23:26.    Single-lead EKG: Regular rhythm    EEG Classification / Summary:  Normal EEG in the awake, drowsy, and asleep states.   No focal or epileptiform abnormalities are captured.   No seizures.  Event of patient tracking but not following commands or moving extremities. EEG is uninterpretable at this time.  Due to artifacts, interpretation is progressively more limited after the first 8 hr 23 min of recording.    Clinical Impression:  Normal video-EEG.  No epileptiform abnormalities or seizures.  Event of patient tracking but not following commands or moving extremities. EEG is uninterpretable at this time.  Due to artifacts, interpretation is progressively more limited after the first 8 hr 23 min of recording.      -------------------------------------------------------------------------------------------------------    Betyz Meredith MD  Attending Physician, [facility] Epilepsy [place]    -------------------------------------------------------------------------------------------------------    To reach EEG technologist:  Please use the pager number for the appropriate hospital or contact the .  At [facility] - Pager #: [phone]    To reach EEG-reading physician:  EEG Reading Room Phone #: [phone]  Epilepsy Answering Service after 5PM and before 8:30AM: Phone #: [phone]     PEBBLES FRY [id number]     Study Date: 12/3/24 12:47 - 12/4/24 08:44  Duration: 19 hr 43 min  --------------------------------------------------------------------------------------------------  History:  CC/ HPI Patient is a 19y old  Male who presents with a chief complaint of possible seizure (03 Dec 2024 04:11)    MEDICATIONS  (STANDING):  azithromycin  IVPB 500 milliGRAM(s) IV Intermittent every 24 hours  cefTRIAXone   IVPB 1000 milliGRAM(s) IV Intermittent every 24 hours  chlorhexidine 2% Cloths 1 Application(s) Topical <User Schedule>    --------------------------------------------------------------------------------------------------  Study Interpretation:    [[[Abbreviation Key:  PDR=alpha rhythm/posterior dominant rhythm. A-P=anterior posterior.  Amplitude: ‘very low’:<20; ‘low’:20-49; ‘medium’:; ‘high’:>150uV.  Persistence for periodic/rhythmic patterns (% of epoch) ‘rare’:<1%; ‘occasional’:1-10%; ‘frequent’:10-50%; ‘abundant’:50-90%; ‘continuous’:>90%.  Persistence for sporadic discharges: ‘rare’:<1/hr; ‘occasional’:1/min-1/hr; ‘frequent’:>1/min; ‘abundant’:>1/10 sec.  RPP=rhythmic and periodic patterns; GRDA=generalized rhythmic delta activity; FIRDA=frontal intermittent GRDA; LRDA=lateralized rhythmic delta activity; TIRDA=temporal intermittent rhythmic delta activity;  LPD=PLED=lateralized periodic discharges; GPD=generalized periodic discharges; BIPDs =bilateral independent periodic discharges; Mf=multifocal; SIRPDs=stimulus induced rhythmic, periodic, or ictal appearing discharges; BIRDs=brief potentially ictal rhythmic discharges >4 Hz, lasting .5-10s; PFA (paroxysmal bursts >13 Hz or =8 Hz <10s).  Modifiers: +F=with fast component; +S=with spike component; +R=with rhythmic component.  S-B=burst suppression pattern.  Max=maximal. N1-drowsy; N2-stage II sleep; N3-slow wave sleep. SSS/BETS=small sharp spikes/benign epileptiform transients of sleep. HV=hyperventilation; PS=photic stimulation]]]    Daily EEG Visual Analysis    FINDINGS:      Background:  Continuity: Continuous  Symmetry: Symmetric  Posterior dominant rhythm (PDR): 10 Hz, reactive to eye closure. Symmetric low-amplitude frontal beta in wakefulness.  Voltage: Normal  Anterior-Posterior Gradient: Present  Other background findings: None  Breach: Absent    Background Slowing:  Generalized slowing: None  Focal slowing: None    State Changes:   Drowsiness is characterized by fragmentation, attenuation, and slowing of the background activity.  Stage 2 sleep is characterized by symmetric K complexes and sleep spindles.     Interictal Findings:  None    Electrographic and Electroclinical seizures:  None    Other Clinical Events:  00:05: Event of unresponsiveness. Per chart, patient tracking but not following commands or moving extremities, improved after Ativan 1 mg for possible catatonia. EEG is uninterpretable at this time.    Activation Procedures:   Hyperventilation is not performed.    Photic stimulation is not performed.    Artifacts:  Intermittent myogenic and movement artifacts are present. After the first 8 hr 23 min of recording (around 21:10), interpretation becomes progressively more limited due to movement artifact, multiple disconnected electrodes, and poor electrode impedance. The study is very limited and intermittently uninterpretable after 23:26.    Single-lead EKG: Regular rhythm    EEG Classification / Summary:  Normal EEG in the awake, drowsy, and asleep states.   No focal or epileptiform abnormalities are captured.   No seizures.  Event of patient tracking but not following commands or moving extremities. EEG is uninterpretable at this time.  Due to artifacts, interpretation is progressively more limited after the first 8 hr 23 min of recording.    Clinical Impression:  Normal video-EEG.  No epileptiform abnormalities or seizures.  Event of patient tracking but not following commands or moving extremities. EEG is uninterpretable at this time.  Due to artifacts, interpretation is progressively more limited after the first 8 hr 23 min of recording.      -------------------------------------------------------------------------------------------------------    Betzy Meredith MD  Attending Physician, [facility] Epilepsy [place]    -------------------------------------------------------------------------------------------------------    To reach EEG technologist:  Please use the pager number for the appropriate hospital or contact the .  At [facility] - Pager #: [phone]    To reach EEG-reading physician:  EEG Reading Room Phone #: [phone]  Epilepsy Answering Service after 5PM and before 8:30AM: Phone #: [phone]

## 2024-12-04 NOTE — CHART NOTE - NSCHARTNOTEFT_GEN_A_CORE
19y M hx schizophrenia vs. bipolar vs. schizoaff, on Zyprexa but recently ran out, admitted for occipital headache. Admitted for further evaluation such as MRI head, EEG and pending LP in AM.     RRT called over head by primary RN given patient was unresponsive. Upon arrival to bedside, patient is tracking with eyes but otherwise not responding to commands or moving extremities. PERRL. No distress noted. Vitals notable for BP 170s-180s systolic and temperature 98.5deg F. O2 sat 100% on RA and heart rate fluctuating betweens 90s-100s. Given hx and current presentation, less concern for stroke and patient likely catatonic. Ativan 1mgx2 given during rapid with slight improvement. Patient able to nod yes/no when asked and opening mouth.    Plan:  - Labs drawn during RRT; will f/u results.   - CT head ordered to r/o other intracranial pathologies.   - Patient on Ativan prn, will continue Ativan q8h for catatonia.  - C/w EEG  - Behavioral health consult in AM  - Vitals per RRT protocol; then q4hrs     Above plan of care d/w Dr. Gamez.   Will continue to monitor patient     Addendum 0034  - As per RN, oral temp 99 deg F. Rectal temp obtained to be 99.4 deg F. Patient wiggling toes and is more responsive than before. Expedited CT head however given pt on EEG, no tech available overnight to re-connect EEG if taken off for CTH. Will relay to day team once EEG team is here to expedite CTH again. 19y M hx schizophrenia vs. bipolar vs. schizoaff, on Zyprexa but recently ran out, admitted for occipital headache. Admitted for further evaluation such as MRI head, EEG and pending LP in AM.     RRT called over head by primary RN given patient was unresponsive. Upon arrival to bedside, patient is tracking with eyes but otherwise not responding to commands or moving extremities. PERRL. No distress noted. Vitals notable for BP 170s-180s systolic and temperature 98.5deg F. O2 sat 100% on RA and heart rate fluctuating betweens 90s-100s. Given hx and current presentation, less concern for stroke and patient likely catatonic. Ativan 1mgx2 given during rapid with slight improvement. Patient able to nod yes/no when asked and opening mouth.    Plan:  - Labs drawn during RRT; will f/u results.   - CT head ordered to r/o other intracranial pathologies.   - Patient on Ativan prn for seizures  - C/w EEG  - Behavioral health consult in AM  - Vitals per RRT protocol; then q4hrs     Above plan of care d/w Dr. Gamez. Also discussed giving Ativan q8h till patient has behavioral health eval in AM. Will give additional dose at 0800 and relay to day team for consult.   Will continue to monitor patient     Addendum 0034  - As per RN, oral temp 99 deg F. Rectal temp obtained to be 99.4 deg F. Patient wiggling toes and is more responsive than before. Expedited CT head however given pt on EEG, no tech available overnight to re-connect EEG if taken off for CTH. Will relay to day team once EEG team is here to expedite CTH again.

## 2024-12-04 NOTE — PROGRESS NOTE ADULT - PROBLEM SELECTOR PLAN 2
Appreciate neurology eval.  Possible PNES  No seizures on EEG, ok to discontinue  - obtain MR Brain w/wo ordered, pending

## 2024-12-04 NOTE — PROGRESS NOTE ADULT - ASSESSMENT
19M hx schizophrenia vs. bipolar vs. schizoaff, on Zyprexa but recently ran out, comes to ED for waxing-waning headache. Neuro initially concerned for structural vs. infectious vs. metabolic vs. epileptic cause, pt unable to tolerate LP x 2. No seizure on EEG. Low concern for meningitis.

## 2024-12-04 NOTE — PROGRESS NOTE ADULT - PROBLEM SELECTOR PLAN 1
- Unclear etiology, neuro following, requested workup pending  - low suspicion for meningitis  - zyprexa withdrawal?  - ok to discontinue CTX and azithromycin

## 2024-12-04 NOTE — CHART NOTE - NSCHARTNOTEFT_GEN_A_CORE
Overnight lab results reviewed:  Syphilis Screen (12.03.24 @ 06:03)  - Treponema Pallidum Antibody Interpretation: Positive  - FTA Syphilis Confirmatory: Reactive  - RPR Titer: <1:1    Results indicative of new or past syphilis infection. Unsure if patient has been treated for syphilis in the past. Patient denies history of syphilis but is a poor historian at baseline. Will relay to day team to obtain collateral from family for more information prior to initiating treatment.    D/w [facility], Dr. Lyman, who agrees with plan.

## 2024-12-04 NOTE — CHART NOTE - NSCHARTNOTEFT_GEN_A_CORE
Mr. Figueroa is a 18yo [place] speaking man with a PMHx significant for questionable history of schizophrenia versus bipolar disorder previously prescribed Zyprexa from a psychiatric hospitalization in [place] months ago (ran out of medication 10 days ago) who presents with persistent HA with significant features of vision changes with a positional component and reported period of unresponsiveness. Labwork notable for leukocytosis. RRT called last night for reported unresponsiveness, which was captured on EEG. No seizure or epileptiform abnormality noted. There was clinical concern for possible catatonia and patient was trialed on Ativan.     Impression:  1. Headache - now improved but reported to have positional component. R/o secondary causes of headache   2. Unresponsiveness- likely related to primary psychiatric disorder, possibly catatonia. Event captured was non-epileptic, low concern for seizure  EEG showed no epileptiform abnormality     Recommendations:    [] MRI Brain w/wo  [] Treat headache with Tylenol   [] Discontinue EEG. Will not start anti-seizure medications at this time due to low concern for seizure   [] No need for LP at this time due to low clinical concern for meningitis   [] Recommend Behavioral Health evaluation    Case discussed with Neurology attending Dr. Sepncer. Mr. Figueroa is a 18yo [place] speaking man with a PMHx significant for questionable history of schizophrenia versus bipolar disorder previously prescribed Zyprexa from a psychiatric hospitalization in [place] months ago (ran out of medication 10 days ago) who presents with persistent HA with significant features of vision changes with a positional component and reported period of unresponsiveness. Labwork notable for leukocytosis. RRT called last night for reported unresponsiveness, which was captured on EEG. No seizure or epileptiform abnormality noted. There was clinical concern for possible catatonia and patient was trialed on Ativan.     EEG 12/4/24  Clinical Impression:  Normal video-EEG.  No epileptiform abnormalities or seizures.  Event of patient tracking but not following commands or moving extremities. EEG is uninterpretable at this time.  Due to artifacts, interpretation is progressively more limited after the first 8 hr 23 min of recording.    Impression:  1. Headache - now improved but reported to have positional component. R/o secondary causes of headache   2. Unresponsiveness- likely related to primary psychiatric disorder, possibly catatonia. No seizure or epileptiform activity noted on EEG (based on 8h 23min without artifact). 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 Will not start anti-seizure medications at this time due to low concern for seizure   [] No need for LP at this time due to low clinical concern for meningitis   [] Recommend Behavioral Health evaluation    Case discussed with Neurology attending Dr. Spencer. Mr. Figueroa is a 20yo [place] speaking man with a PMHx significant for questionable history of schizophrenia versus bipolar disorder previously prescribed Zyprexa from a psychiatric hospitalization in [place] months ago (ran out of medication 10 days ago) who presents with persistent HA with significant features of vision changes with a positional component and reported period of unresponsiveness. Labwork notable for leukocytosis. RRT called last night for reported unresponsiveness. There was clinical concern for possible catatonia and patient was trialed on Ativan.     EEG 12/4/24  Clinical Impression:  Normal video-EEG.  No epileptiform abnormalities or seizures.  Event of patient tracking but not following commands or moving extremities. EEG is uninterpretable at this time.  Due to artifacts, interpretation is progressively more limited after the first 8 hr 23 min of recording.    Impression:  1. Headache - now improved but reported to have positional component. R/o secondary causes of headache   2. Unresponsiveness- likely related to primary psychiatric disorder, possibly catatonia. No seizure or epileptiform activity noted on EEG (based on 8h 23min without artifact). 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 Will not start anti-seizure medications at this time due to low concern for seizure   [] No need for LP at this time due to low clinical concern for meningitis   [] Recommend Behavioral Health evaluation    Case discussed with Neurology attending Dr. Spencer.  Thank you

## 2024-12-05 DIAGNOSIS — G93.49 OTHER ENCEPHALOPATHY: ICD-10-CM

## 2024-12-05 LAB
ANION GAP SERPL CALC-SCNC: 16 MMOL/L — HIGH (ref 7–14)
BUN SERPL-MCNC: 13 MG/DL — SIGNIFICANT CHANGE UP (ref 7–23)
CALCIUM SERPL-MCNC: 9.4 MG/DL — SIGNIFICANT CHANGE UP (ref 8.4–10.5)
CHLORIDE SERPL-SCNC: 101 MMOL/L — SIGNIFICANT CHANGE UP (ref 98–107)
CO2 SERPL-SCNC: 21 MMOL/L — LOW (ref 22–31)
CREAT SERPL-MCNC: 0.75 MG/DL — SIGNIFICANT CHANGE UP (ref 0.5–1.3)
EGFR: 132 ML/MIN/1.73M2 — SIGNIFICANT CHANGE UP
GLUCOSE SERPL-MCNC: 97 MG/DL — SIGNIFICANT CHANGE UP (ref 70–99)
HCT VFR BLD CALC: 45 % — SIGNIFICANT CHANGE UP (ref 39–50)
HGB BLD-MCNC: 14.8 G/DL — SIGNIFICANT CHANGE UP (ref 13–17)
MAGNESIUM SERPL-MCNC: 2 MG/DL — SIGNIFICANT CHANGE UP (ref 1.6–2.6)
MCHC RBC-ENTMCNC: 27.8 PG — SIGNIFICANT CHANGE UP (ref 27–34)
MCHC RBC-ENTMCNC: 32.9 G/DL — SIGNIFICANT CHANGE UP (ref 32–36)
MCV RBC AUTO: 84.4 FL — SIGNIFICANT CHANGE UP (ref 80–100)
MRSA PCR RESULT.: SIGNIFICANT CHANGE UP
NRBC # BLD: 0 /100 WBCS — SIGNIFICANT CHANGE UP (ref 0–0)
NRBC # FLD: 0 K/UL — SIGNIFICANT CHANGE UP (ref 0–0)
PHOSPHATE SERPL-MCNC: 4.3 MG/DL — SIGNIFICANT CHANGE UP (ref 2.5–4.5)
PLATELET # BLD AUTO: 257 K/UL — SIGNIFICANT CHANGE UP (ref 150–400)
POTASSIUM SERPL-MCNC: 4.2 MMOL/L — SIGNIFICANT CHANGE UP (ref 3.5–5.3)
POTASSIUM SERPL-SCNC: 4.2 MMOL/L — SIGNIFICANT CHANGE UP (ref 3.5–5.3)
RBC # BLD: 5.33 M/UL — SIGNIFICANT CHANGE UP (ref 4.2–5.8)
RBC # FLD: 12.6 % — SIGNIFICANT CHANGE UP (ref 10.3–14.5)
S AUREUS DNA NOSE QL NAA+PROBE: SIGNIFICANT CHANGE UP
SODIUM SERPL-SCNC: 138 MMOL/L — SIGNIFICANT CHANGE UP (ref 135–145)
WBC # BLD: 11.49 K/UL — HIGH (ref 3.8–10.5)
WBC # FLD AUTO: 11.49 K/UL — HIGH (ref 3.8–10.5)

## 2024-12-05 PROCEDURE — 99232 SBSQ HOSP IP/OBS MODERATE 35: CPT

## 2024-12-05 PROCEDURE — 99233 SBSQ HOSP IP/OBS HIGH 50: CPT

## 2024-12-05 PROCEDURE — 99254 IP/OBS CNSLTJ NEW/EST MOD 60: CPT | Mod: GC

## 2024-12-05 RX ADMIN — Medication 2 MILLIGRAM(S): at 16:04

## 2024-12-05 RX ADMIN — CHLORHEXIDINE GLUCONATE 1 APPLICATION(S): 1.2 RINSE ORAL at 05:05

## 2024-12-05 RX ADMIN — Medication 100 MILLIGRAM(S): at 19:24

## 2024-12-05 RX ADMIN — ACETAMINOPHEN 500MG 650 MILLIGRAM(S): 500 TABLET, COATED ORAL at 01:01

## 2024-12-05 RX ADMIN — AZITHROMYCIN 255 MILLIGRAM(S): 250 TABLET, FILM COATED ORAL at 22:58

## 2024-12-05 NOTE — BH CONSULTATION LIAISON PROGRESS NOTE - NSBHASSESSMENTFT_PSY_ALL_CORE
20yo man with a possible previous psychiatric diagnosis of schizoaffective disorder or bipolar disorder or schizophrenia, possibly on Zyprexa vs a different medication, presenting with occipital headache, episode of collapsing while walking associated with confusion lasting 2-3 minutes and slight urinary incontinence. Pt's first episode several months ago led him to present to [facility] where he states he was prescribed a medication which he was taking regularly up until ten days ago when he ran out; he is unclear on what his diagnosis was at the time and is also unclear on whether the medication which he was prescribed is called Zyprexa or something else. Psych consulted for episode of unresponsiveness possibly c/f catatonia.     Given pt responsiveness, cooperation, and lack of rigidity on exam today as well as questionable previous psych dx, low concern for catatonia at this time.    12/5: Pt stable, continuing to endorse head "heaviness" and dizziness, spotty vision with standing up. Continues to deny SIIP/HIIP/AH/VH. Labs show positive treponemal pallidum antibody and FTA syphilis confirmatory.       PLAN:  -Further workup as per neuro team, appreciate recs  -No role for 1:1 CO at this time  -No role for psychotropic medications at this time   - Agitation: Ativan 1mg PO/IM/IV q 6 PRN   18yo man with a questionableevious psychiatric diagnosis of schizoaffective disorder or bipolar disorder or schizophrenia, possibly on Zyprexa vs a different medication, presenting with occipital headache, episode of collapsing while walking associated with confusion lasting 2-3 minutes and slight urinary incontinence. Pt's first episode several months ago led him to present to [facility] where he states he was prescribed a medication which he was taking regularly up until ten days ago when he ran out; he is unclear on what his diagnosis was at the time and is also unclear on whether the medication which he was prescribed is called Zyprexa or something else. Psych consulted for episode of unresponsiveness possibly c/f catatonia.     Given pt responsiveness, cooperation, and lack of rigidity on exam today as well as questionable previous psych dx, low concern for catatonia at this time.    12/5: Pt stable, continuing to endorse head "heaviness" and dizziness, spotty vision with standing up. Continues to deny SIIP/HIIP/AH/VH. Labs show positive treponemal pallidum antibody and FTA syphilis confirmatory.       PLAN:  -Further workup as per neuro team, appreciate recs  -Attempt to get collateral from family or [facility] where pt says he has been treated in the past to determine previous diagnosis and what medication pt has been taking  -No role for 1:1 CO at this time  -No role for psychotropic medications at this time   - Agitation: Ativan 1mg PO/IM/IV q 6 PRN   20yo man with a questionableevious psychiatric diagnosis of schizoaffective disorder or bipolar disorder or schizophrenia, possibly on Zyprexa vs a different medication, presenting with occipital headache, episode of collapsing while walking associated with confusion lasting 2-3 minutes and slight urinary incontinence. Pt's first episode several months ago led him to present to [facility] where he states he was prescribed a medication which he was taking regularly up until ten days ago when he ran out; he is unclear on what his diagnosis was at the time and is also unclear on whether the medication which he was prescribed is called Zyprexa or something else. Psych consulted for episode of unresponsiveness possibly c/f catatonia.     Given pt responsiveness, cooperation, and lack of rigidity on exam today as well as questionable previous psych dx, low concern for catatonia at this time.    12/5: Pt stable, continuing to endorse head "heaviness" and dizziness, spotty vision with standing up. Continues to deny SIIP/HIIP/AH/VH. Labs show positive treponemal pallidum antibody and FTA syphilis confirmatory.       PLAN:  -Further workup as per neuro team, appreciate recs  -Attempt to get collateral from family or [facility] to determine previous diagnosis and what medication pt has been taking  -No role for 1:1 CO at this time  -No role for psychotropic medications at this time   - Agitation: Ativan 1mg PO/IM/IV q 6 PRN   18yo man with a questionableevious psychiatric diagnosis of schizoaffective disorder or bipolar disorder or schizophrenia, possibly on Zyprexa vs a different medication, presenting with occipital headache, episode of collapsing while walking associated with confusion lasting 2-3 minutes and slight urinary incontinence. Pt's first episode several months ago led him to present to [facility] where he states he was prescribed a medication which he was taking regularly up until ten days ago when he ran out; he is unclear on what his diagnosis was at the time and is also unclear on whether the medication which he was prescribed is called Zyprexa or something else. Psych consulted for episode of unresponsiveness possibly c/f catatonia.     Given pt responsiveness, cooperation, and lack of rigidity on exam today as well as questionable previous psych dx, low concern for catatonia at this time.    12/5: Pt stable, continuing to endorse head "heaviness" and dizziness, spotty vision with standing up. Continues to deny SIIP/HIIP/AH/VH. Labs show positive treponemal pallidum antibody and FTA syphilis confirmatory.       PLAN:  -Further workup as per neuro team, appreciate recs  -Attempt to get collateral from family or [facility] to determine previous diagnosis and what medication pt has been taking  -No role for 1:1 CO at this time  -No role for psychotropic medications at this time   - Agitation: Ativan 1mg PO/IM/IV q 6 PRN  - Dispo: TBD

## 2024-12-05 NOTE — PROGRESS NOTE ADULT - SUBJECTIVE AND OBJECTIVE BOX
[facility] Division of Hospital Medicine  Britt Zavala MD   Available on TEAMS      MARIAH overnight  Pt evaluated at bedside, improved headaches. Sitting up, eating breakfast. Cooperative.   Tested + syphilis. denies sexual activity. Unclear if previously treated.    MEDICATIONS  (STANDING):  azithromycin  IVPB 500 milliGRAM(s) IV Intermittent every 24 hours  cefTRIAXone   IVPB 1000 milliGRAM(s) IV Intermittent every 24 hours  chlorhexidine 2% Cloths 1 Application(s) Topical <User Schedule>    MEDICATIONS  (PRN):  acetaminophen     Tablet .. 650 milliGRAM(s) Oral every 6 hours PRN Temp greater or equal to 38C (100.4F), Mild Pain (1 - 3)  aluminum hydroxide/magnesium hydroxide/simethicone Suspension 30 milliLiter(s) Oral every 4 hours PRN Dyspepsia  LORazepam   Injectable 2 milliGRAM(s) IV Push every 8 hours PRN seizure >3 minutes  melatonin 3 milliGRAM(s) Oral at bedtime PRN Insomnia  ondansetron Injectable 4 milliGRAM(s) IV Push every 8 hours PRN Nausea and/or Vomiting      Vital Signs Last 24 Hrs  T(C): 36.9 (05 Dec 2024 16:48), Max: 37.1 (05 Dec 2024 13:19)  T(F): 98.5 (05 Dec 2024 16:48), Max: 98.7 (05 Dec 2024 13:19)  HR: 91 (05 Dec 2024 16:48) (73 - 98)  BP: 125/77 (05 Dec 2024 16:48) (116/72 - 135/76)  BP(mean): --  RR: 17 (05 Dec 2024 16:48) (17 - 18)  SpO2: 100% (05 Dec 2024 16:48) (99% - 100%)    Parameters below as of 05 Dec 2024 16:48  Patient On (Oxygen Delivery Method): room air    CONSTITUTIONAL: NAD, well-groomed  EYES: PERRLA; conjunctiva and sclera clear  ENMT: Moist oral mucosa, no pharyngeal injection or exudates; normal dentition  NECK: Supple, no palpable masses; no thyromegaly  RESPIRATORY: Normal respiratory effort; lungs are clear to auscultation bilaterally  CARDIOVASCULAR: Regular rate and rhythm, normal S1 and S2, no murmur/rub/gallop; No lower extremity edema; Peripheral pulses are 2+ bilaterally  ABDOMEN: Nontender to palpation, normoactive bowel sounds, no rebound/guarding; No hepatosplenomegaly  MUSCULOSKELETAL:  Normal gait; no clubbing or cyanosis of digits; no joint swelling or tenderness to palpation  PSYCH: A+O to person, place, and time; affect appropriate  NEUROLOGY: CN 2-12 are intact and symmetric; no gross sensory deficits   SKIN: No rashes; no palpable lesions

## 2024-12-05 NOTE — PROGRESS NOTE ADULT - ASSESSMENT
19M hx schizophrenia vs. bipolar vs. schizoaff, on Zyprexa but recently ran out, comes to ED for waxing-waning headache. Neuro initially concerned for structural vs. infectious vs. metabolic vs. epileptic cause, pt unable to tolerate LP x 2. No seizure on EEG. Low concern for meningitis. Possibly, 2/2 neurosyphilis, pending LP.

## 2024-12-05 NOTE — CONSULT NOTE ADULT - ATTENDING COMMENTS
[facility]  #[id number]  Multiple c/o as above. No B/B changes.  Per nurse - normal urinary function.      Exam:  Motor: 5/5 throughout but again slowly goes to the ground when we try to ambulate with him.  He is able to slowly get back up with assistance.   Legs move normally spontaneously and he has 5/5 strength in the legs before and after we try to ambulate with him.    Reflexes 2 throughout, toes mute.   FNF & toes touching my finger  with no ataxia.     A/P  Mr. Figueroa is a 20 yo man with multiple c/o as above.   R/O secondary cause of headache - MRI Brain w/wo.   Possible seizure - 24 hour EEG.   He goes to the ground when we try to ambulate with him but has normal leg strength on exam - physical therapy.   Thank you
19M hx schizophrenia vs. bipolar vs. schizoaff, on Zyprexa but recently ran out, comes to ED for waxing-waning headache.    Assessment:  #Headaches  #Leucocytosis   #Syphilis screen positive    -Pt afebrile with elevated WBC on admission  -Treponema pallidum ab +, FTA reactive, RPR <1:1  -BCX 12/2 NGTD  -CT head neg, CT C/A/P unremarkable  -CRP <3      Recommendation:  -Pt with severe headaches with + FTA confirmatory test, need to r/o neurosyphillis. Pt needs LP to r/o neurosyphillis, please call neuro radiology for LP. Obtain LP with CSF glucose, CSF protein, CSF cell count, CSF PCR, HSV PCR, VDLR titer and CSF bacterial/fungal/viral culture  -Recommend getting HIV, GC/Chlamydia, ESR, Hep B and C serology   -Cont to monitor fever and WBC curve   - MRI brain with and w/o contrast.     Aj Morales  Please contact through MS Teams   If no response or past 5 pm/weekend call [phone].

## 2024-12-05 NOTE — PROVIDER CONTACT NOTE (CRITICAL VALUE NOTIFICATION) - BACKGROUND
Patient is a 19 M with history of schizophrenia, bipolar, comes to ED for waxing and waning headache. Admitted for altered mental status.

## 2024-12-05 NOTE — BH CONSULTATION LIAISON PROGRESS NOTE - NSBHMSEMOOD_PSY_A_CORE
[facility]  Obstetrics  Labor Progress Note    Patient Name: Mae Marshall  MRN: [id number]  Admission Date: 2018  Hospital Length of Stay: 1 days  Attending Physician: Ami Rodriguez MD  Primary Care Provider: CAREN Fox    09/15/18 0500)  Pulse: 75 (09/15/18 1102)  Resp: 20 (09/15/pregnancy, antepartum    CBC on admit              Karen Suresh CNM  Obstetrics  [facility]             Normal

## 2024-12-05 NOTE — BH CONSULTATION LIAISON PROGRESS NOTE - NSBHFUPINTERVALHXFT_PSY_A_CORE
Pt seen and examined at bedside. Continues to endorse "heaviness" in his head and dizziness, spotty vision every time he gets up. Pt alert and oriented x4. Pt was informed that his lab results showed that he was positive for syphilis; verbalized understanding. Was still unable to confirm whether he had any previous psychiatric dx or which medication he was on prior to admission; he did state that this medication "made him sleepy." Pt denying SIIP/HIIP/AH/VH.  Pt seen and examined at bedside. Continues to endorse "heaviness" in his head and dizziness, spotty vision every time he gets up. Pt alert and oriented x4. Pt was informed that his lab results showed that he was positive for syphilis; verbalized understanding. Was still unable to confirm whether he had any previous psychiatric dx or which medication he was on prior to admission; he did state that this medication "made him sleepy." Pt denying SIIP/HIIP/AH/VH.    Attempted to contact [facility], where pt reports he was previously treated when his symptoms first started; writer got through to a representative but was then transferred to another department and subsequently hung up on.

## 2024-12-05 NOTE — CONSULT NOTE ADULT - SUBJECTIVE AND OBJECTIVE BOX
Patient is a 19y old  Male who presents with a chief complaint of possible seizure (04 Dec 2024 18:20)    HPI:  19M hx schizophrenia vs. bipolar disorder, previously on Zyprexa (unknown dose, ran out 10 days ago), presents with headache x3 days. Patient is poor historian, but reports sudden onset occipital-area headache radiating to top of head. Reported unclear visual disturbance to neuro, denies during my exam. Visited ED on 12/2, was discharged after headache self-resolved. Headache since worsened, and he had an episode of "collapse" while walking, w/o headstrike, associated wth 2-3 minute period of reduced consciousness/awareness. Small amount of urine leakage during this episode. (03 Dec 2024 04:11)       prior hospital charts reviewed [  ]  primary team notes reviewed [  ]  other consultant notes reviewed [  ]    PAST MEDICAL & SURGICAL HISTORY:  Anxiety      No significant past surgical history          Allergies  No Known Allergies    ANTIMICROBIALS (past 90 days)  MEDICATIONS  (STANDING):  azithromycin  IVPB   255 mL/Hr IV Intermittent (12-04-24 @ 19:47)   255 mL/Hr IV Intermittent (12-03-24 @ 17:25)    cefTRIAXone   IVPB   100 mL/Hr IV Intermittent (12-04-24 @ 19:47)   100 mL/Hr IV Intermittent (12-03-24 @ 17:25)        azithromycin  IVPB 500 every 24 hours  cefTRIAXone   IVPB 1000 every 24 hours    MEDICATIONS  (STANDING):  acetaminophen     Tablet .. 650 every 6 hours PRN  aluminum hydroxide/magnesium hydroxide/simethicone Suspension 30 every 4 hours PRN  LORazepam   Injectable 2 every 8 hours PRN  melatonin 3 at bedtime PRN  ondansetron Injectable 4 every 8 hours PRN    SOCIAL HISTORY:       FAMILY HISTORY:    REVIEW OF SYSTEMS  [  ] ROS unobtainable because:    [  ] All other systems negative except as noted below:	    Constitutional:  [ ] fever [ ] chills  [ ] weight loss  [ ] weakness  Skin:  [ ] rash [ ] phlebitis	  Eyes: [ ] icterus [ ] pain  [ ] discharge	  ENMT: [ ] sore throat  [ ] thrush [ ] ulcers [ ] exudates  Respiratory: [ ] dyspnea [ ] hemoptysis [ ] cough [ ] sputum	  Cardiovascular:  [ ] chest pain [ ] palpitations [ ] edema	  Gastrointestinal:  [ ] nausea [ ] vomiting [ ] diarrhea [ ] constipation [ ] pain	  Genitourinary:  [ ] dysuria [ ] frequency [ ] hematuria [ ] discharge [ ] flank pain  [ ] incontinence  Musculoskeletal:  [ ] myalgias [ ] arthralgias [ ] arthritis  [ ] back pain  Neurological:  [ ] headache [ ] seizures  [ ] confusion/altered mental status  Psychiatric:  [ ] anxiety [ ] depression	  Hematology/Lymphatics:  [ ] lymphadenopathy  Endocrine:  [ ] adrenal [ ] thyroid  Allergic/Immunologic:	 [ ] transplant [ ] seasonal    Vital Signs Last 24 Hrs  T(F): 97.5 (12-05-24 @ 05:30), Max: 99.4 (12-04-24 @ 00:34)  Vital Signs Last 24 Hrs  HR: 82 (12-05-24 @ 05:30) (78 - 98)  BP: 122/71 (12-05-24 @ 05:30) (122/71 - 135/76)  RR: 18 (12-05-24 @ 05:30)  SpO2: 100% (12-05-24 @ 05:30) (99% - 100%)  Wt(kg): --    PHYSICAL EXAM:  Constitutional: non-toxic, no distress  HEAD/EYES: anicteric, no conjunctival injection  ENT:  supple, no thrush  Cardiovascular:   normal S1, S2, no murmur, no edema  Respiratory:  clear BS bilaterally, no wheezes, no rales  GI:  soft, non-tender, normal bowel sounds  :  no delgado, no CVA tenderness  Musculoskeletal:  no synovitis, normal ROM  Neurologic: awake and alert, normal strength, no focal findings  Skin:  no rash, no erythema, no phlebitis  Heme/Onc: no lymphadenopathy   Psychiatric:  awake, alert, appropriate mood                            14.8   11.49 )-----------( 257      ( 05 Dec 2024 06:42 )             45.0   12-05    138  |  101  |  13  ----------------------------<  97  4.2   |  21[L]  |  0.75    Ca    9.4      05 Dec 2024 06:42  Phos  4.3     12-05  Mg     2.00     12-05    TPro  7.8  /  Alb  4.6  /  TBili  0.4  /  DBili  x   /  AST  23  /  ALT  29  /  AlkPhos  84  12-04    Urinalysis Basic - ( 05 Dec 2024 06:42 )    Color: x / Appearance: x / SG: x / pH: x  Gluc: 97 mg/dL / Ketone: x  / Bili: x / Urobili: x   Blood: x / Protein: x / Nitrite: x   Leuk Esterase: x / RBC: x / WBC x   Sq Epi: x / Non Sq Epi: x / Bacteria: x    MICROBIOLOGY:  Urinalysis with Rflx Culture (collected 02 Dec 2024 20:35)    Culture - Blood (collected 02 Dec 2024 16:20)  Source: .Blood BLOOD  Preliminary Report (04 Dec 2024 21:01):    No growth at 48 Hours    Culture - Blood (collected 02 Dec 2024 16:00)  Source: .Blood BLOOD  Preliminary Report (04 Dec 2024 21:01):    No growth at 48 Hours              Rapid RVP Result: NotDetec (12-03 @ 01:45)      RADIOLOGY:  imaging below personally reviewed and agree with findings    < from: CT Head No Cont (12.04.24 @ 12:02) >  IMPRESSION:   Unremarkable head CT.    No adverse interval change.   Consider MR evaluation    < end of copied text >    < from: CT Abdomen and Pelvis w/ IV Cont (12.02.24 @ 19:20) >    IMPRESSION:  No acute intrathoracic, abdominal or pelvic findings.    < end of copied text >    < from: CT Cervical Spine No Cont (12.02.24 @ 19:20) >  IMPRESSION:    CT HEAD:  No acute intracranial hemorrhage, mass effect, or midline shift.    CT CERVICAL SPINE:  No acute fracture or traumatic subluxation.    < end of copied text >   Patient is a 19y old  Male who presents with a chief complaint of possible seizure (04 Dec 2024 18:20)    HPI:  19M hx schizophrenia vs. bipolar disorder, previously on Zyprexa (unknown dose, ran out 10 days ago), presents with headache for 1 month. States that it comes and goes. Denies any fever at home. Pt found to have + syphilis screen        PAST MEDICAL & SURGICAL HISTORY:  Anxiety      No significant past surgical history          Allergies  No Known Allergies    ANTIMICROBIALS (past 90 days)  MEDICATIONS  (STANDING):  azithromycin  IVPB   255 mL/Hr IV Intermittent (12-04-24 @ 19:47)   255 mL/Hr IV Intermittent (12-03-24 @ 17:25)    cefTRIAXone   IVPB   100 mL/Hr IV Intermittent (12-04-24 @ 19:47)   100 mL/Hr IV Intermittent (12-03-24 @ 17:25)        azithromycin  IVPB 500 every 24 hours  cefTRIAXone   IVPB 1000 every 24 hours    MEDICATIONS  (STANDING):  acetaminophen     Tablet .. 650 every 6 hours PRN  aluminum hydroxide/magnesium hydroxide/simethicone Suspension 30 every 4 hours PRN  LORazepam   Injectable 2 every 8 hours PRN  melatonin 3 at bedtime PRN  ondansetron Injectable 4 every 8 hours PRN    SOCIAL HISTORY: Pt states that he is not sexually active. Denies any smoking or alcohol use     FAMILY HISTORY: Diabetes     REVIEW OF SYSTEMS:    CONSTITUTIONAL: No weakness, fevers or chills. + headaches   EYES:  No visual changes, no eye pain   ENT: No vertigo or throat pain   NECK: No pain or stiffness  RESPIRATORY: No cough, wheezing, hemoptysis; No shortness of breath  CARDIOVASCULAR: No chest pain or palpitations  GASTROINTESTINAL: No abdominal or epigastric pain. No nausea, vomiting, or hematemesis; No diarrhea or constipation. No melena or hematochezia.  GENITOURINARY: No dysuria, frequency or hematuria  NEUROLOGICAL: No numbness or weakness  SKIN: No itching, rashes    Vital Signs Last 24 Hrs  T(F): 97.5 (12-05-24 @ 05:30), Max: 99.4 (12-04-24 @ 00:34)  Vital Signs Last 24 Hrs  HR: 82 (12-05-24 @ 05:30) (78 - 98)  BP: 122/71 (12-05-24 @ 05:30) (122/71 - 135/76)  RR: 18 (12-05-24 @ 05:30)  SpO2: 100% (12-05-24 @ 05:30) (99% - 100%)  Wt(kg): --    PHYSICAL EXAM:  Constitutional: non-toxic, no distress  HEAD/EYES: anicteric, no conjunctival injection  ENT:  supple, no thrush  Cardiovascular:   normal S1, S2, no murmur, no edema  Respiratory:  clear BS bilaterally, no wheezes, no rales  GI:  soft, non-tender, normal bowel sounds  :  no delgado, no CVA tenderness  Neurologic: awake and alert  Skin:  no rash, no erythema, no phlebitis  Psychiatric:  awake, alert, appropriate mood                            14.8   11.49 )-----------( 257      ( 05 Dec 2024 06:42 )             45.0   12-05    138  |  101  |  13  ----------------------------<  97  4.2   |  21[L]  |  0.75    Ca    9.4      05 Dec 2024 06:42  Phos  4.3     12-05  Mg     2.00     12-05    TPro  7.8  /  Alb  4.6  /  TBili  0.4  /  DBili  x   /  AST  23  /  ALT  29  /  AlkPhos  84  12-04    Urinalysis Basic - ( 05 Dec 2024 06:42 )    Color: x / Appearance: x / SG: x / pH: x  Gluc: 97 mg/dL / Ketone: x  / Bili: x / Urobili: x   Blood: x / Protein: x / Nitrite: x   Leuk Esterase: x / RBC: x / WBC x   Sq Epi: x / Non Sq Epi: x / Bacteria: x    MICROBIOLOGY:  Urinalysis with Rflx Culture (collected 02 Dec 2024 20:35)    Culture - Blood (collected 02 Dec 2024 16:20)  Source: .Blood BLOOD  Preliminary Report (04 Dec 2024 21:01):    No growth at 48 Hours    Culture - Blood (collected 02 Dec 2024 16:00)  Source: .Blood BLOOD  Preliminary Report (04 Dec 2024 21:01):    No growth at 48 Hours              Rapid RVP Result: NotDetec (12-03 @ 01:45)      RADIOLOGY:  imaging below personally reviewed and agree with findings    < from: CT Head No Cont (12.04.24 @ 12:02) >  IMPRESSION:   Unremarkable head CT.    No adverse interval change.   Consider MR evaluation    < end of copied text >    < from: CT Abdomen and Pelvis w/ IV Cont (12.02.24 @ 19:20) >    IMPRESSION:  No acute intrathoracic, abdominal or pelvic findings.    < end of copied text >    < from: CT Cervical Spine No Cont (12.02.24 @ 19:20) >  IMPRESSION:    CT HEAD:  No acute intracranial hemorrhage, mass effect, or midline shift.    CT CERVICAL SPINE:  No acute fracture or traumatic subluxation.    < end of copied text >   Patient is a 19y old  Male who presents with a chief complaint of possible seizure (04 Dec 2024 18:20)    HPI:  19M hx schizophrenia vs. bipolar disorder, previously on Zyprexa (unknown dose, ran out 10 days ago), presents with headache for 1 month. States that it comes and goes. Denies any fever at home. Pt found to have + syphilis screen, denies ever being sexually active.       PAST MEDICAL & SURGICAL HISTORY:  Anxiety      No significant past surgical history          Allergies  No Known Allergies    ANTIMICROBIALS (past 90 days)  MEDICATIONS  (STANDING):  azithromycin  IVPB   255 mL/Hr IV Intermittent (12-04-24 @ 19:47)   255 mL/Hr IV Intermittent (12-03-24 @ 17:25)    cefTRIAXone   IVPB   100 mL/Hr IV Intermittent (12-04-24 @ 19:47)   100 mL/Hr IV Intermittent (12-03-24 @ 17:25)        azithromycin  IVPB 500 every 24 hours  cefTRIAXone   IVPB 1000 every 24 hours    MEDICATIONS  (STANDING):  acetaminophen     Tablet .. 650 every 6 hours PRN  aluminum hydroxide/magnesium hydroxide/simethicone Suspension 30 every 4 hours PRN  LORazepam   Injectable 2 every 8 hours PRN  melatonin 3 at bedtime PRN  ondansetron Injectable 4 every 8 hours PRN        SOCIAL HISTORY: Pt states that he is not sexually active. Denies any smoking or alcohol use         FAMILY HISTORY: Diabetes         REVIEW OF SYSTEMS:    CONSTITUTIONAL: No weakness, fevers or chills.   EYES:  No visual changes, no eye pain   ENT: No throat pain   NECK: No pain or stiffness  RESPIRATORY: No cough, hemoptysis; No shortness of breath  CARDIOVASCULAR: No chest pain   GASTROINTESTINAL: No abdominal or epigastric pain. No nausea, vomiting  GENITOURINARY: No dysuria, frequency   EXTREMITIES: No edema  NEUROLOGICAL: + headaches   SKIN: No itching, rashes        Vital Signs Last 24 Hrs  T(F): 97.5 (12-05-24 @ 05:30), Max: 99.4 (12-04-24 @ 00:34)  Vital Signs Last 24 Hrs  HR: 82 (12-05-24 @ 05:30) (78 - 98)  BP: 122/71 (12-05-24 @ 05:30) (122/71 - 135/76)  RR: 18 (12-05-24 @ 05:30)  SpO2: 100% (12-05-24 @ 05:30) (99% - 100%)  Wt(kg): --    PHYSICAL EXAM:  Constitutional: non-toxic, no distress  HEAD/EYES: anicteric, no conjunctival injection  ENT:  supple, no thrush  Cardiovascular:   normal S1, S2,   Respiratory:  clear BS bilaterally,   GI:  soft, non-tender,  :  no delgado, no CVA tenderness  Neurologic: awake and alert  Extremities: No edema   Skin:  no rash,   Psychiatric:  awake, alert, appropriate mood                            14.8   11.49 )-----------( 257      ( 05 Dec 2024 06:42 )             45.0   12-05    138  |  101  |  13  ----------------------------<  97  4.2   |  21[L]  |  0.75    Ca    9.4      05 Dec 2024 06:42  Phos  4.3     12-05  Mg     2.00     12-05    TPro  7.8  /  Alb  4.6  /  TBili  0.4  /  DBili  x   /  AST  23  /  ALT  29  /  AlkPhos  84  12-04    Urinalysis Basic - ( 05 Dec 2024 06:42 )    Color: x / Appearance: x / SG: x / pH: x  Gluc: 97 mg/dL / Ketone: x  / Bili: x / Urobili: x   Blood: x / Protein: x / Nitrite: x   Leuk Esterase: x / RBC: x / WBC x   Sq Epi: x / Non Sq Epi: x / Bacteria: x    MICROBIOLOGY:  Urinalysis with Rflx Culture (collected 02 Dec 2024 20:35)    Culture - Blood (collected 02 Dec 2024 16:20)  Source: .Blood BLOOD  Preliminary Report (04 Dec 2024 21:01):    No growth at 48 Hours    Culture - Blood (collected 02 Dec 2024 16:00)  Source: .Blood BLOOD  Preliminary Report (04 Dec 2024 21:01):    No growth at 48 Hours      Rapid RVP Result: NotDetec (12-03 @ 01:45)        RADIOLOGY:  imaging below personally reviewed and agree with findings    < from: CT Head No Cont (12.04.24 @ 12:02) >  IMPRESSION:   Unremarkable head CT.    No adverse interval change.   Consider MR evaluation        < from: CT Abdomen and Pelvis w/ IV Cont (12.02.24 @ 19:20) >    IMPRESSION:  No acute intrathoracic, abdominal or pelvic findings.        < from: CT Cervical Spine No Cont (12.02.24 @ 19:20) >  IMPRESSION:    CT HEAD:  No acute intracranial hemorrhage, mass effect, or midline shift.    CT CERVICAL SPINE:  No acute fracture or traumatic subluxation.

## 2024-12-05 NOTE — PROVIDER CONTACT NOTE (CRITICAL VALUE NOTIFICATION) - SITUATION
Addended byMk GRIJALVA on: 9/4/2020 01:18 PM     Modules accepted: Orders Patient tested positive for syphilis.

## 2024-12-05 NOTE — PROGRESS NOTE ADULT - PROBLEM SELECTOR PLAN 1
Possibly 2/2 neurosyphilis. Appreciate ID recommendations.   Pt with severe headaches with + FTA confirmatory test, concerning for neurosyphillis. Pt needs LP to r/o neurosyphillis  - consult neuro radiology for LP.   Obtain LP with CSF glucose, CSF protein, CSF cell count, CSF PCR, HSV PCR, VDLR titer and CSF bacterial/fungal/viral culture  -Recommend getting HIV, GC/Chlamydia, ESR, Hep B and C serology   -Cont to monitor fever and WBC curve  - headaches improving

## 2024-12-05 NOTE — CONSULT NOTE ADULT - ASSESSMENT
19M hx schizophrenia vs. bipolar vs. schizoaff, on Zyprexa but recently ran out, comes to ED for waxing-waning headache.    Assessment:  #Headaches  -Pt afebrile with elevated WBC on admission  -Treponema pallidum ab +, FTA reactive, RPR <1:1  -BCX 12/2 NGTD  -CT head neg, CT C/A/P unremarkable    Recommendation:   19M hx schizophrenia vs. bipolar vs. schizoaff, on Zyprexa but recently ran out, comes to ED for waxing-waning headache.    Assessment:  #Headaches  -Pt afebrile with elevated WBC on admission  -Treponema pallidum ab +, FTA reactive, RPR <1:1  -BCX 12/2 NGTD  -CT head neg, CT C/A/P unremarkable  -CRP <3    Recommendation:  -Pt with severe headaches with + FTA confirmatory test, concerning for neurosyphillis. Pt needs LP to r/o neurosyphillis, please call neuro radiology for LP. Obtain LP with CSF glucose, CSF protein, CSF cell count, CSF PCR, HSV PCR, VDLR titer and CSF bacterial/fungal/viral culture  -Recommend getting HIV, GC/Chlamydia, ESR, Hep B and C serology   -Cont to monitor fever and WBC curve     Discussed w/ Medicine team.     Chu SEWELL Fellow

## 2024-12-05 NOTE — BH CONSULTATION LIAISON PROGRESS NOTE - NSBHCHARTREVIEWLAB_PSY_A_CORE FT
14.8   11.49 )-----------( 257      ( 05 Dec 2024 06:42 )             45.0       12-05    138  |  101  |  13  ----------------------------<  97  4.2   |  21[L]  |  0.75    Ca    9.4      05 Dec 2024 06:42  Phos  4.3     12-05  Mg     2.00     12-05    TPro  7.8  /  Alb  4.6  /  TBili  0.4  /  DBili  x   /  AST  23  /  ALT  29  /  AlkPhos  84  12-04              Urinalysis Basic - ( 05 Dec 2024 06:42 )    Color: x / Appearance: x / SG: x / pH: x  Gluc: 97 mg/dL / Ketone: x  / Bili: x / Urobili: x   Blood: x / Protein: x / Nitrite: x   Leuk Esterase: x / RBC: x / WBC x   Sq Epi: x / Non Sq Epi: x / Bacteria: x        PT/INR - ( 03 Dec 2024 23:55 )   PT: 11.5 sec;   INR: 0.99 ratio         PTT - ( 03 Dec 2024 23:55 )  PTT:35.8 sec          CAPILLARY BLOOD GLUCOSE      POCT Blood Glucose.: 132 mg/dL (03 Dec 2024 23:43)

## 2024-12-05 NOTE — CHART NOTE - NSCHARTNOTEFT_GEN_A_CORE
Case discussed with Dr Rayshawn Helm (Neuro radiology), Ok to Place IR Procedure order for LP with Sedation.

## 2024-12-05 NOTE — CHART NOTE - NSCHARTNOTEFT_GEN_A_CORE
Attempted to arrange for a LP with Sedation by IR, Neuroradiology Called, Dr Rayshawn Helm paged, awaiting call back prior to Ordering the LP.   will make pt NPO MN and order Coags in am just in case procedure can scheduled.

## 2024-12-05 NOTE — BH CONSULTATION LIAISON PROGRESS NOTE - NSBHATTESTCOMMENTATTENDFT_PSY_A_CORE
Chart reviewed. Seen w/ trainee, via . Pt very limited, with little insight. Ox 3, c/o dizziness in concrete manner. Has little awareness of past history. Exam rather limited, though no focal neuro deficits/catatonia. Agree with above assessment/recs. Will continue to follow

## 2024-12-06 DIAGNOSIS — R79.89 OTHER SPECIFIED ABNORMAL FINDINGS OF BLOOD CHEMISTRY: ICD-10-CM

## 2024-12-06 DIAGNOSIS — E55.9 VITAMIN D DEFICIENCY, UNSPECIFIED: ICD-10-CM

## 2024-12-06 DIAGNOSIS — E63.9 NUTRITIONAL DEFICIENCY, UNSPECIFIED: ICD-10-CM

## 2024-12-06 LAB
ANION GAP SERPL CALC-SCNC: 11 MMOL/L — SIGNIFICANT CHANGE UP (ref 7–14)
APPEARANCE CSF: CLEAR — SIGNIFICANT CHANGE UP
APTT BLD: 32.2 SEC — SIGNIFICANT CHANGE UP (ref 24.5–35.6)
AQP4 H2O CHANNEL AB SERPL IA-ACNC: NEGATIVE — SIGNIFICANT CHANGE UP
BACTERIAL AG PNL SER: 0 % — SIGNIFICANT CHANGE UP
BASOPHILS # BLD AUTO: 0.05 K/UL — SIGNIFICANT CHANGE UP (ref 0–0.2)
BASOPHILS NFR BLD AUTO: 0.5 % — SIGNIFICANT CHANGE UP (ref 0–2)
BUN SERPL-MCNC: 13 MG/DL — SIGNIFICANT CHANGE UP (ref 7–23)
CALCIUM SERPL-MCNC: 9.2 MG/DL — SIGNIFICANT CHANGE UP (ref 8.4–10.5)
CHLORIDE SERPL-SCNC: 103 MMOL/L — SIGNIFICANT CHANGE UP (ref 98–107)
CO2 SERPL-SCNC: 21 MMOL/L — LOW (ref 22–31)
COLOR CSF: COLORLESS — SIGNIFICANT CHANGE UP
CREAT SERPL-MCNC: 0.75 MG/DL — SIGNIFICANT CHANGE UP (ref 0.5–1.3)
CSF PCR RESULT: SIGNIFICANT CHANGE UP
EGFR: 132 ML/MIN/1.73M2 — SIGNIFICANT CHANGE UP
EOSINOPHIL # BLD AUTO: 0.27 K/UL — SIGNIFICANT CHANGE UP (ref 0–0.5)
EOSINOPHIL # CSF: 0 % — SIGNIFICANT CHANGE UP
EOSINOPHIL NFR BLD AUTO: 2.5 % — SIGNIFICANT CHANGE UP (ref 0–6)
GLUCOSE CSF-MCNC: 64 MG/DL — SIGNIFICANT CHANGE UP (ref 40–70)
GLUCOSE SERPL-MCNC: 100 MG/DL — HIGH (ref 70–99)
GRAM STN FLD: SIGNIFICANT CHANGE UP
HCT VFR BLD CALC: 44.3 % — SIGNIFICANT CHANGE UP (ref 39–50)
HGB BLD-MCNC: 14.6 G/DL — SIGNIFICANT CHANGE UP (ref 13–17)
IANC: 5.44 K/UL — SIGNIFICANT CHANGE UP (ref 1.8–7.4)
IMM GRANULOCYTES NFR BLD AUTO: 1.1 % — HIGH (ref 0–0.9)
INR BLD: 1.04 RATIO — SIGNIFICANT CHANGE UP (ref 0.85–1.16)
LDH CSF L TO P-CCNC: 14 U/L — SIGNIFICANT CHANGE UP
LDH FLD-CCNC: 14 U/L — SIGNIFICANT CHANGE UP
LYMPHOCYTES # BLD AUTO: 39.8 % — SIGNIFICANT CHANGE UP (ref 13–44)
LYMPHOCYTES # BLD AUTO: 4.35 K/UL — HIGH (ref 1–3.3)
LYMPHOCYTES # CSF: 51 % — SIGNIFICANT CHANGE UP
MAGNESIUM SERPL-MCNC: 2.2 MG/DL — SIGNIFICANT CHANGE UP (ref 1.6–2.6)
MCHC RBC-ENTMCNC: 28.1 PG — SIGNIFICANT CHANGE UP (ref 27–34)
MCHC RBC-ENTMCNC: 33 G/DL — SIGNIFICANT CHANGE UP (ref 32–36)
MCV RBC AUTO: 85.2 FL — SIGNIFICANT CHANGE UP (ref 80–100)
MONOCYTES # BLD AUTO: 0.69 K/UL — SIGNIFICANT CHANGE UP (ref 0–0.9)
MONOCYTES NFR BLD AUTO: 6.3 % — SIGNIFICANT CHANGE UP (ref 2–14)
MONOS+MACROS NFR CSF: 15 % — SIGNIFICANT CHANGE UP
NEUTROPHILS # BLD AUTO: 5.44 K/UL — SIGNIFICANT CHANGE UP (ref 1.8–7.4)
NEUTROPHILS # CSF: 0 % — SIGNIFICANT CHANGE UP
NEUTROPHILS NFR BLD AUTO: 49.8 % — SIGNIFICANT CHANGE UP (ref 43–77)
NRBC # BLD: 0 /100 WBCS — SIGNIFICANT CHANGE UP (ref 0–0)
NRBC # FLD: 0 K/UL — SIGNIFICANT CHANGE UP (ref 0–0)
NRBC NFR CSF: 1 CELLS/UL — SIGNIFICANT CHANGE UP (ref 0–5)
OTHER CELLS CSF MANUAL: 0 % — SIGNIFICANT CHANGE UP
PHOSPHATE SERPL-MCNC: 3.8 MG/DL — SIGNIFICANT CHANGE UP (ref 2.5–4.5)
PLATELET # BLD AUTO: 236 K/UL — SIGNIFICANT CHANGE UP (ref 150–400)
POTASSIUM SERPL-MCNC: 4.3 MMOL/L — SIGNIFICANT CHANGE UP (ref 3.5–5.3)
POTASSIUM SERPL-SCNC: 4.3 MMOL/L — SIGNIFICANT CHANGE UP (ref 3.5–5.3)
PROT CSF-MCNC: 30 MG/DL — SIGNIFICANT CHANGE UP (ref 15–45)
PROTHROM AB SERPL-ACNC: 12.4 SEC — SIGNIFICANT CHANGE UP (ref 9.9–13.4)
PYRIDOXAL PHOS SERPL-MCNC: 19 UG/L — SIGNIFICANT CHANGE UP (ref 3.4–65.2)
RBC # BLD: 5.2 M/UL — SIGNIFICANT CHANGE UP (ref 4.2–5.8)
RBC # CSF: 1 CELLS/UL — HIGH (ref 0–0)
RBC # FLD: 12.3 % — SIGNIFICANT CHANGE UP (ref 10.3–14.5)
SODIUM SERPL-SCNC: 135 MMOL/L — SIGNIFICANT CHANGE UP (ref 135–145)
SPECIMEN SOURCE: SIGNIFICANT CHANGE UP
TOTAL CELLS COUNTED, SPINAL FLUID: 66 CELLS — SIGNIFICANT CHANGE UP
TUBE TYPE: SIGNIFICANT CHANGE UP
WBC # BLD: 10.92 K/UL — HIGH (ref 3.8–10.5)
WBC # FLD AUTO: 10.92 K/UL — HIGH (ref 3.8–10.5)

## 2024-12-06 PROCEDURE — 99232 SBSQ HOSP IP/OBS MODERATE 35: CPT

## 2024-12-06 PROCEDURE — 62328 DX LMBR SPI PNXR W/FLUOR/CT: CPT

## 2024-12-06 RX ADMIN — CHLORHEXIDINE GLUCONATE 1 APPLICATION(S): 1.2 RINSE ORAL at 05:53

## 2024-12-06 RX ADMIN — ACETAMINOPHEN 500MG 650 MILLIGRAM(S): 500 TABLET, COATED ORAL at 16:08

## 2024-12-06 RX ADMIN — ACETAMINOPHEN 500MG 650 MILLIGRAM(S): 500 TABLET, COATED ORAL at 17:00

## 2024-12-06 RX ADMIN — Medication 100 MILLIGRAM(S): at 18:22

## 2024-12-06 NOTE — PROGRESS NOTE ADULT - PROBLEM SELECTOR PLAN 3
- [facility] emailed for med rec  - resume zyprexa when dose is determined  - f/u  recommendations B12 level 212  - start oral supplementation

## 2024-12-06 NOTE — PROGRESS NOTE ADULT - PROBLEM SELECTOR PLAN 5
- DVT: restart lovenox 40mg daily  - Diet: reg  - Dispo: pending above workup - [facility] emailed for med rec  - resume zyprexa when dose is determined  - f/u  recommendations

## 2024-12-06 NOTE — PROGRESS NOTE ADULT - PROBLEM SELECTOR PLAN 1
Possibly 2/2 neurosyphilis. Appreciate ID recommendations.   Pt with severe headaches with + FTA confirmatory test, concerning for neurosyphillis. Pt needs LP to r/o neurosyphillis  - consult neuro radiology for LP.   Obtain LP with CSF glucose, CSF protein, CSF cell count, CSF PCR, HSV PCR, VDLR titer and CSF bacterial/fungal/viral culture  -Recommend getting HIV, GC/Chlamydia, ESR, Hep B and C serology   -Cont to monitor fever and WBC curve  - headaches improving Possibly 2/2 neurosyphilis. Appreciate ID recommendations.   Pt with severe headaches with + FTA confirmatory test, concerning for neurosyphillis. s/p LP 12/6/24 to r/o neurosyphillis  - f/u LP with CSF glucose, CSF protein, CSF cell count, CSF PCR, HSV PCR, VDLR titer and CSF bacterial/fungal/viral culture  -f/u HIV, GC/Chlamydia, ESR, Hep B and C serology   -Cont to monitor fever and WBC curve  - headaches improving

## 2024-12-06 NOTE — PROGRESS NOTE ADULT - ASSESSMENT
19M hx schizophrenia vs. bipolar vs. schizoaff, on Zyprexa but recently ran out, comes to ED for waxing-waning headache. Neuro initially concerned for structural vs. infectious vs. metabolic vs. epileptic cause, pt unable to tolerate LP x 2. No seizure on EEG. Low concern for meningitis. Possibly, 2/2 neurosyphilis, pending LP.   19M hx schizophrenia vs. bipolar vs. schizoaff, on Zyprexa but recently ran out, comes to ED for waxing-waning headache. Neuro initially concerned for structural vs. infectious vs. metabolic vs. epileptic cause, pt unable to tolerate LP x 2. No seizure on EEG. Low concern for meningitis. Possibly, 2/2 neurosyphilis, pending LP results.

## 2024-12-06 NOTE — PROGRESS NOTE ADULT - SUBJECTIVE AND OBJECTIVE BOX
[facility] Division of Hospital Medicine  Britt Zavala MD   Available on TEAMS      MARIAH overnight  Pt evaluated at bedside, improved headaches. Sitting up, eating breakfast. Cooperative.   Tested + syphilis. denies sexual activity. Unclear if previously treated.    MEDICATIONS  (STANDING):  azithromycin  IVPB 500 milliGRAM(s) IV Intermittent every 24 hours  cefTRIAXone   IVPB 1000 milliGRAM(s) IV Intermittent every 24 hours  chlorhexidine 2% Cloths 1 Application(s) Topical <User Schedule>    MEDICATIONS  (PRN):  acetaminophen     Tablet .. 650 milliGRAM(s) Oral every 6 hours PRN Temp greater or equal to 38C (100.4F), Mild Pain (1 - 3)  aluminum hydroxide/magnesium hydroxide/simethicone Suspension 30 milliLiter(s) Oral every 4 hours PRN Dyspepsia  LORazepam   Injectable 2 milliGRAM(s) IV Push every 8 hours PRN seizure >3 minutes  melatonin 3 milliGRAM(s) Oral at bedtime PRN Insomnia  ondansetron Injectable 4 milliGRAM(s) IV Push every 8 hours PRN Nausea and/or Vomiting      Vital Signs Last 24 Hrs  T(C): 36.9 (05 Dec 2024 16:48), Max: 37.1 (05 Dec 2024 13:19)  T(F): 98.5 (05 Dec 2024 16:48), Max: 98.7 (05 Dec 2024 13:19)  HR: 91 (05 Dec 2024 16:48) (73 - 98)  BP: 125/77 (05 Dec 2024 16:48) (116/72 - 135/76)  BP(mean): --  RR: 17 (05 Dec 2024 16:48) (17 - 18)  SpO2: 100% (05 Dec 2024 16:48) (99% - 100%)    Parameters below as of 05 Dec 2024 16:48  Patient On (Oxygen Delivery Method): room air    CONSTITUTIONAL: NAD, well-groomed  EYES: PERRLA; conjunctiva and sclera clear  ENMT: Moist oral mucosa, no pharyngeal injection or exudates; normal dentition  NECK: Supple, no palpable masses; no thyromegaly  RESPIRATORY: Normal respiratory effort; lungs are clear to auscultation bilaterally  CARDIOVASCULAR: Regular rate and rhythm, normal S1 and S2, no murmur/rub/gallop; No lower extremity edema; Peripheral pulses are 2+ bilaterally  ABDOMEN: Nontender to palpation, normoactive bowel sounds, no rebound/guarding; No hepatosplenomegaly  MUSCULOSKELETAL:  Normal gait; no clubbing or cyanosis of digits; no joint swelling or tenderness to palpation  PSYCH: A+O to person, place, and time; affect appropriate  NEUROLOGY: CN 2-12 are intact and symmetric; no gross sensory deficits   SKIN: No rashes; no palpable lesions      LABS:                    14.6   10.92 )-----------( 236      ( 06 Dec 2024 05:55 )             44.3     135  |  103  |  13  ----------------------------<  100[H]  4.3   |  21[L]  |  0.75    Ca    9.2      06 Dec 2024 05:55  Phos  3.8     12-06  Mg     2.20     12-06    PT/INR - ( 06 Dec 2024 05:55 )   PT: 12.4 sec;   INR: 1.04 ratio    PTT - ( 06 Dec 2024 05:55 )  PTT:32.2 sec      CT Cervical Spine No Cont (12.02.24 @ 19:20) >  IMPRESSION:    CT HEAD:  No acute intracranial hemorrhage, mass effect, or midline shift.    CT CERVICAL SPINE:  No acute fracture or traumatic subluxation.      CT Abdomen and Pelvis w/ IV Cont 12.02.24  IMPRESSION:  No acute intrathoracic, abdominal or pelvic findings.    CT Chest w/ IV Cont 12.02.24   CHEST:  LUNGS AND LARGE AIRWAYS: Patent central airways. Slightly limited   assessment of the pulmonary parenchyma due to respiratory motion   artifact. No pulmonary nodules or parenchymal consolidation. Mild   dependent atelectasis bilaterally.  PLEURA: No pleural effusion.  VESSELS: Within normal limits.  HEART: Heart size is normal. No pericardial effusion.  MEDIASTINUM AND LISA: No lymphadenopathy.  CHEST WALL AND LOWER NECK: Within normal limits.      CT Head No Cont (12.04.24 @ 12:02)   IMPRESSION:   Unremarkable head CT.    No adverse interval change.   Consider MR evaluation        Urinalysis Basic - ( 05 Dec 2024 06:42 )    Color: x / Appearance: x / SG: x / pH: x  Gluc: 97 mg/dL / Ketone: x  / Bili: x / Urobili: x   Blood: x / Protein: x / Nitrite: x   Leuk Esterase: x / RBC: x / WBC x   Sq Epi: x / Non Sq Epi: x / Bacteria: x    MICROBIOLOGY:  Urinalysis with Rflx Culture (collected 02 Dec 2024 20:35)    Culture - Blood (collected 02 Dec 2024 16:20)  Source: .Blood BLOOD  Preliminary Report (04 Dec 2024 21:01):    No growth at 48 Hours    Culture - Blood (collected 02 Dec 2024 16:00)  Source: .Blood BLOOD  Preliminary Report (04 Dec 2024 21:01):    No growth at 48 Hours      Rapid RVP Result: NotDetec (12-03 @ 01:45)   [facility] Division of Hospital Medicine  Britt Zavala MD   Available on TEAMS      MARIAH overnight  Pt evaluated at bedside, improved headaches. Sitting up, eating breakfast. Cooperative.   Tested + syphilis. denies sexual activity. Unclear if previously treated.    12/6/24:  -pending LP       MEDICATIONS  (STANDING):  azithromycin  IVPB 500 milliGRAM(s) IV Intermittent every 24 hours  cefTRIAXone   IVPB 1000 milliGRAM(s) IV Intermittent every 24 hours  chlorhexidine 2% Cloths 1 Application(s) Topical <User Schedule>    MEDICATIONS  (PRN):  acetaminophen     Tablet .. 650 milliGRAM(s) Oral every 6 hours PRN Temp greater or equal to 38C (100.4F), Mild Pain (1 - 3)  aluminum hydroxide/magnesium hydroxide/simethicone Suspension 30 milliLiter(s) Oral every 4 hours PRN Dyspepsia  LORazepam   Injectable 2 milliGRAM(s) IV Push every 8 hours PRN seizure >3 minutes  melatonin 3 milliGRAM(s) Oral at bedtime PRN Insomnia  ondansetron Injectable 4 milliGRAM(s) IV Push every 8 hours PRN Nausea and/or Vomiting      Vital Signs Last 24 Hrs  T(C): 36.9 (05 Dec 2024 16:48), Max: 37.1 (05 Dec 2024 13:19)  T(F): 98.5 (05 Dec 2024 16:48), Max: 98.7 (05 Dec 2024 13:19)  HR: 91 (05 Dec 2024 16:48) (73 - 98)  BP: 125/77 (05 Dec 2024 16:48) (116/72 - 135/76)  BP(mean): --  RR: 17 (05 Dec 2024 16:48) (17 - 18)  SpO2: 100% (05 Dec 2024 16:48) (99% - 100%)    Parameters below as of 05 Dec 2024 16:48  Patient On (Oxygen Delivery Method): room air    CONSTITUTIONAL: NAD, well-groomed  EYES: PERRLA; conjunctiva and sclera clear  ENMT: Moist oral mucosa, no pharyngeal injection or exudates; normal dentition  NECK: Supple, no palpable masses; no thyromegaly  RESPIRATORY: Normal respiratory effort; lungs are clear to auscultation bilaterally  CARDIOVASCULAR: Regular rate and rhythm, normal S1 and S2, no murmur/rub/gallop; No lower extremity edema; Peripheral pulses are 2+ bilaterally  ABDOMEN: Nontender to palpation, normoactive bowel sounds, no rebound/guarding; No hepatosplenomegaly  MUSCULOSKELETAL:  Normal gait; no clubbing or cyanosis of digits; no joint swelling or tenderness to palpation  PSYCH: A+O to person, place, and time; affect appropriate  NEUROLOGY: CN 2-12 are intact and symmetric; no gross sensory deficits   SKIN: No rashes; no palpable lesions      LABS:                    14.6   10.92 )-----------( 236      ( 06 Dec 2024 05:55 )             44.3     135  |  103  |  13  ----------------------------<  100[H]  4.3   |  21[L]  |  0.75    Ca    9.2      06 Dec 2024 05:55  Phos  3.8     12-06  Mg     2.20     12-06    PT/INR - ( 06 Dec 2024 05:55 )   PT: 12.4 sec;   INR: 1.04 ratio    PTT - ( 06 Dec 2024 05:55 )  PTT:32.2 sec      CT Cervical Spine No Cont (12.02.24 @ 19:20) >  IMPRESSION:    CT HEAD:  No acute intracranial hemorrhage, mass effect, or midline shift.    CT CERVICAL SPINE:  No acute fracture or traumatic subluxation.      CT Abdomen and Pelvis w/ IV Cont 12.02.24  IMPRESSION:  No acute intrathoracic, abdominal or pelvic findings.    CT Chest w/ IV Cont 12.02.24   CHEST:  LUNGS AND LARGE AIRWAYS: Patent central airways. Slightly limited   assessment of the pulmonary parenchyma due to respiratory motion   artifact. No pulmonary nodules or parenchymal consolidation. Mild   dependent atelectasis bilaterally.  PLEURA: No pleural effusion.  VESSELS: Within normal limits.  HEART: Heart size is normal. No pericardial effusion.  MEDIASTINUM AND LISA: No lymphadenopathy.  CHEST WALL AND LOWER NECK: Within normal limits.      CT Head No Cont (12.04.24 @ 12:02)   IMPRESSION:   Unremarkable head CT.    No adverse interval change.   Consider MR evaluation        Urinalysis Basic - ( 05 Dec 2024 06:42 )    Color: x / Appearance: x / SG: x / pH: x  Gluc: 97 mg/dL / Ketone: x  / Bili: x / Urobili: x   Blood: x / Protein: x / Nitrite: x   Leuk Esterase: x / RBC: x / WBC x   Sq Epi: x / Non Sq Epi: x / Bacteria: x    MICROBIOLOGY:  Urinalysis with Rflx Culture (collected 02 Dec 2024 20:35)    Culture - Blood (collected 02 Dec 2024 16:20)  Source: .Blood BLOOD  Preliminary Report (04 Dec 2024 21:01):    No growth at 48 Hours    Culture - Blood (collected 02 Dec 2024 16:00)  Source: .Blood BLOOD  Preliminary Report (04 Dec 2024 21:01):    No growth at 48 Hours      Rapid RVP Result: NotDetec (12-03 @ 01:45)   [facility] Division of Hospital Medicine  Britt Zavala MD   Available on TEAMS      MARIAH overnight  Pt evaluated at bedside, improved headaches. Sitting up, eating breakfast. Cooperative.   Tested + syphilis. denies sexual activity. Unclear if previously treated.    12/6/24:  -pending LP       MEDICATIONS  (STANDING):  azithromycin  IVPB 500 milliGRAM(s) IV Intermittent every 24 hours  cefTRIAXone   IVPB 1000 milliGRAM(s) IV Intermittent every 24 hours  chlorhexidine 2% Cloths 1 Application(s) Topical <User Schedule>    MEDICATIONS  (PRN):  acetaminophen     Tablet .. 650 milliGRAM(s) Oral every 6 hours PRN Temp greater or equal to 38C (100.4F), Mild Pain (1 - 3)  aluminum hydroxide/magnesium hydroxide/simethicone Suspension 30 milliLiter(s) Oral every 4 hours PRN Dyspepsia  LORazepam   Injectable 2 milliGRAM(s) IV Push every 8 hours PRN seizure >3 minutes  melatonin 3 milliGRAM(s) Oral at bedtime PRN Insomnia  ondansetron Injectable 4 milliGRAM(s) IV Push every 8 hours PRN Nausea and/or Vomiting      Vital Signs Last 24 Hrs  T(C): 36.9 (05 Dec 2024 16:48), Max: 37.1 (05 Dec 2024 13:19)  T(F): 98.5 (05 Dec 2024 16:48), Max: 98.7 (05 Dec 2024 13:19)  HR: 91 (05 Dec 2024 16:48) (73 - 98)  BP: 125/77 (05 Dec 2024 16:48) (116/72 - 135/76)  BP(mean): --  RR: 17 (05 Dec 2024 16:48) (17 - 18)  SpO2: 100% (05 Dec 2024 16:48) (99% - 100%)    Parameters below as of 05 Dec 2024 16:48  Patient On (Oxygen Delivery Method): room air    CONSTITUTIONAL: NAD, well-groomed  EYES: PERRLA; conjunctiva and sclera clear  ENMT: Moist oral mucosa, no pharyngeal injection or exudates; normal dentition  NECK: Supple, no palpable masses; no thyromegaly  RESPIRATORY: Normal respiratory effort; lungs are clear to auscultation bilaterally  CARDIOVASCULAR: Regular rate and rhythm, normal S1 and S2, no murmur/rub/gallop; No lower extremity edema; Peripheral pulses are 2+ bilaterally  ABDOMEN: Nontender to palpation, normoactive bowel sounds, no rebound/guarding; No hepatosplenomegaly  MUSCULOSKELETAL:  Normal gait; no clubbing or cyanosis of digits; no joint swelling or tenderness to palpation  PSYCH: A+O to person, place, and time; affect appropriate  NEUROLOGY: CN 2-12 are intact and symmetric; no gross sensory deficits   SKIN: No rashes; no palpable lesions      LABS:                    14.6   10.92 )-----------( 236      ( 06 Dec 2024 05:55 )             44.3     135  |  103  |  13  ----------------------------<  100[H]  4.3   |  21[L]  |  0.75    Ca    9.2      06 Dec 2024 05:55  Phos  3.8     12-06  Mg     2.20     12-06    PT/INR - ( 06 Dec 2024 05:55 )   PT: 12.4 sec;   INR: 1.04 ratio    PTT - ( 06 Dec 2024 05:55 )  PTT:32.2 sec    Vitamin B12, Serum: 212 pg/mL (12.03.24 @ 06:03)   Vitamin D, 25-Hydroxy: 11.1: Deficiency: Less than 20 ng/mL Folate, Serum: 8.2 ng/mL (12.03.24 @ 06:03) Thyroid Stimulating Hormone, Serum: 3.97 uIU/mL (12.03.24 @ 06:03) Respiratory Viral Panel with COVID-19 by SHERWIN (12.03.24 @ 01:45)   Rapid RVP Result: [facility]  [facility] This [facility] Panel uses polymerase chain reaction (PCR) to detect for   adenovirus; coronavirus (HKU1, NL63, 229E, OC43); human metapneumovirus   (hMPV); human enterovirus/rhinovirus (Entero/RV); influenza A; influenza   A/H1; influenza A/H3; influenza A/H1-2009; influenza B; parainfluenza   viruses 1, 2, 3, 4; respiratory syncytial virus; Mycoplasma pneumoniae;   Chlamydophila pneumoniae; and SARS-CoV-2.  Adenovirus (RapRVP): NotDete  Influenza A (RapRVP): NotDetec  Influenza B (RapRVP): NotDetec  Parainfluenza 1 (RapRVP): NotDetec  Parainfluenza 2 (RapRVP): NotDetec  Parainfluenza 3 (RapRVP): NotDetec  Parainfluenza 4 (RapRVP): NotDetec  Resp Syncytial Virus (RapRVP): NotDetec  Bordetella pertussis (RapRVP): NotDetec  Bordetella parapertussis (RapRVP): NotDetec  Chlamydia pneumoniae (RapRVP): NotDetec  Mycoplasma pneumoniae (RapRVP): NotDetec  Entero/Rhinovirus (RapRVP): NotDetec  HKU1 Coronavirus (RapRVP): NotDetec  NL63 Coronavirus (RapRVP): NotDetec  229E Coronavirus (RapRVP): NotDetec  OC43 Coronavirus (RapRVP): NotDetec  hMPV (RapRVP): NotDetecFluA/FluB/RSV/COVID PCR (12.03.24 @ 01:45)   SARS-CoV-2 Result: NotDetec: This Respiratory Panel uses polymerase chain reaction (PCR) to detect for   influenza A; influenza B; respiratory syncytial virus; and SARS-CoV-2.   Test results should be correlated with clinical presentation, patient   history, and epidemiology.  Influenza A Result: NotDetec  Influenza B Result: NotDetec    Resp Syn Virus Result: NotDetecFTA Syphilis Confirmatory: Reactive: Method: Multiplex flow immunoassay for total treponema pallidum antibody (12.03.24 @ 06:03)  Treponema Pallidum Antibody Interpretation: Positive:       CT Cervical Spine No Cont (12.02.24 @ 19:20) >  IMPRESSION:    CT HEAD:  No acute intracranial hemorrhage, mass effect, or midline shift.    CT CERVICAL SPINE:  No acute fracture or traumatic subluxation.      CT Abdomen and Pelvis w/ IV Cont 12.02.24  IMPRESSION:  No acute intrathoracic, abdominal or pelvic findings.    CT Chest w/ IV Cont 12.02.24   CHEST:  LUNGS AND LARGE AIRWAYS: Patent central airways. Slightly limited   assessment of the pulmonary parenchyma due to respiratory motion   artifact. No pulmonary nodules or parenchymal consolidation. Mild   dependent atelectasis bilaterally.  PLEURA: No pleural effusion.  VESSELS: Within normal limits.  HEART: Heart size is normal. No pericardial effusion.  MEDIASTINUM AND LISA: No lymphadenopathy.  CHEST WALL AND LOWER NECK: Within normal limits.      CT Head No Cont (12.04.24 @ 12:02)   IMPRESSION:   Unremarkable head CT.    No adverse interval change.   Consider MR evaluation        Urinalysis Basic - ( 05 Dec 2024 06:42 )    Color: x / Appearance: x / SG: x / pH: x  Gluc: 97 mg/dL / Ketone: x  / Bili: x / Urobili: x   Blood: x / Protein: x / Nitrite: x   Leuk Esterase: x / RBC: x / WBC x   Sq Epi: x / Non Sq Epi: x / Bacteria: x    MICROBIOLOGY:  Urinalysis with Rflx Culture (collected 02 Dec 2024 20:35)    Culture - Blood (collected 02 Dec 2024 16:20)  Source: .Blood BLOOD  Preliminary Report (04 Dec 2024 21:01):    No growth at 48 Hours    Culture - Blood (collected 02 Dec 2024 16:00)  Source: .Blood BLOOD  Preliminary Report (04 Dec 2024 21:01):    No growth at 48 Hours      Rapid RVP Result: NotDetec (12-03 @ 01:45)       [facility] Division of Hospital Medicine  Britt Zavala MD   Available on TEAMS      MARIAH overnight      12/6/24:  Was made NPO overnight for LP this am.  -pending LP       MEDICATIONS  (STANDING):  azithromycin  IVPB 500 milliGRAM(s) IV Intermittent every 24 hours  cefTRIAXone   IVPB 1000 milliGRAM(s) IV Intermittent every 24 hours  chlorhexidine 2% Cloths 1 Application(s) Topical <User Schedule>    MEDICATIONS  (PRN):  acetaminophen     Tablet .. 650 milliGRAM(s) Oral every 6 hours PRN Temp greater or equal to 38C (100.4F), Mild Pain (1 - 3)  aluminum hydroxide/magnesium hydroxide/simethicone Suspension 30 milliLiter(s) Oral every 4 hours PRN Dyspepsia  LORazepam   Injectable 2 milliGRAM(s) IV Push every 8 hours PRN seizure >3 minutes  melatonin 3 milliGRAM(s) Oral at bedtime PRN Insomnia  ondansetron Injectable 4 milliGRAM(s) IV Push every 8 hours PRN Nausea and/or Vomiting      Vital Signs Last 24 Hrs  T(C): 36.9 (05 Dec 2024 16:48), Max: 37.1 (05 Dec 2024 13:19)  T(F): 98.5 (05 Dec 2024 16:48), Max: 98.7 (05 Dec 2024 13:19)  HR: 91 (05 Dec 2024 16:48) (73 - 98)  BP: 125/77 (05 Dec 2024 16:48) (116/72 - 135/76)  BP(mean): --  RR: 17 (05 Dec 2024 16:48) (17 - 18)  SpO2: 100% (05 Dec 2024 16:48) (99% - 100%)    Parameters below as of 05 Dec 2024 16:48  Patient On (Oxygen Delivery Method): room air    CONSTITUTIONAL: NAD, well-groomed  EYES: PERRLA; conjunctiva and sclera clear  ENMT: Moist oral mucosa, no pharyngeal injection or exudates; normal dentition  NECK: Supple, no palpable masses; no thyromegaly  RESPIRATORY: Normal respiratory effort; lungs are clear to auscultation bilaterally  CARDIOVASCULAR: Regular rate and rhythm, normal S1 and S2, no murmur/rub/gallop; No lower extremity edema; Peripheral pulses are 2+ bilaterally  ABDOMEN: Nontender to palpation, normoactive bowel sounds, no rebound/guarding; No hepatosplenomegaly  MUSCULOSKELETAL:  Normal gait; no clubbing or cyanosis of digits; no joint swelling or tenderness to palpation  PSYCH: A+O to person, place, and time; affect appropriate  NEUROLOGY: CN 2-12 are intact and symmetric; no gross sensory deficits   SKIN: No rashes; no palpable lesions      LABS:                    14.6   10.92 )-----------( 236      ( 06 Dec 2024 05:55 )             44.3     135  |  103  |  13  ----------------------------<  100[H]  4.3   |  21[L]  |  0.75    Ca    9.2      06 Dec 2024 05:55  Phos  3.8     12-06  Mg     2.20     12-06    PT/INR - ( 06 Dec 2024 05:55 )   PT: 12.4 sec;   INR: 1.04 ratio    PTT - ( 06 Dec 2024 05:55 )  PTT:32.2 sec    Vitamin B12, Serum: 212 pg/mL (12.03.24 @ 06:03)   Vitamin D, 25-Hydroxy: 11.1: Deficiency: Less than 20 ng/mL Folate, Serum: 8.2 ng/mL (12.03.24 @ 06:03) Thyroid Stimulating Hormone, Serum: 3.97 uIU/mL (12.03.24 @ 06:03) Respiratory Viral Panel with COVID-19 by SHERWIN (12.03.24 @ 01:45)   Rapid RVP Result: [facility]  [facility] This [facility] uses polymerase chain reaction (PCR) to detect for   adenovirus; coronavirus (HKU1, NL63, 229E, OC43); human metapneumovirus   (hMPV); human enterovirus/rhinovirus (Entero/RV); influenza A; influenza   A/H1; influenza A/H3; influenza A/H1-2009; influenza B; parainfluenza   viruses 1, 2, 3, 4; respiratory syncytial virus; Mycoplasma pneumoniae;   Chlamydophila pneumoniae; and SARS-CoV-2.  Adenovirus (RapRVP): NotDete  Influenza A (RapRVP): NotDete  Influenza B (RapRVP): NotDetec  Parainfluenza 1 (RapRVP): NotDetec  Parainfluenza 2 (RapRVP): NotDetec  Parainfluenza 3 (RapRVP): NotDetec  Parainfluenza 4 (RapRVP): NotDetec  Resp Syncytial Virus (RapRVP): NotDetec  Bordetella pertussis (RapRVP): NotDetec  Bordetella parapertussis (RapRVP): NotDetec  Chlamydia pneumoniae (RapRVP): NotDetec  Mycoplasma pneumoniae (RapRVP): NotDetec  Entero/Rhinovirus (RapRVP): NotDetec  HKU1 Coronavirus (RapRVP): NotDetec  NL63 Coronavirus (RapRVP): NotDetec  229E Coronavirus (RapRVP): NotDetec  OC43 Coronavirus (RapRVP): NotDetec  hMPV (RapRVP): NotDetecFluA/FluB/RSV/COVID PCR (12.03.24 @ 01:45)   SARS-CoV-2 Result: NotDete: This Respiratory Panel uses polymerase chain reaction (PCR) to detect for   influenza A; influenza B; respiratory syncytial virus; and SARS-CoV-2.   Test results should be correlated with clinical presentation, patient   history, and epidemiology.  Influenza A Result: NotDetec  Influenza B Result: NotDete    Resp Syn Virus Result: NotDetecFTA Syphilis Confirmatory: Reactive: Method: Multiplex flow immunoassay for total treponema pallidum antibody (12.03.24 @ 06:03)  Treponema Pallidum Antibody Interpretation: Positive:       CT Cervical Spine No Cont (12.02.24 @ 19:20) >  IMPRESSION:    CT HEAD:  No acute intracranial hemorrhage, mass effect, or midline shift.    CT CERVICAL SPINE:  No acute fracture or traumatic subluxation.      CT Abdomen and Pelvis w/ IV Cont 12.02.24  IMPRESSION:  No acute intrathoracic, abdominal or pelvic findings.    CT Chest w/ IV Cont 12.02.24   CHEST:  LUNGS AND LARGE AIRWAYS: Patent central airways. Slightly limited   assessment of the pulmonary parenchyma due to respiratory motion   artifact. No pulmonary nodules or parenchymal consolidation. Mild   dependent atelectasis bilaterally.  PLEURA: No pleural effusion.  VESSELS: Within normal limits.  HEART: Heart size is normal. No pericardial effusion.  MEDIASTINUM AND LISA: No lymphadenopathy.  CHEST WALL AND LOWER NECK: Within normal limits.      CT Head No Cont (12.04.24 @ 12:02)   IMPRESSION:   Unremarkable head CT.    No adverse interval change.   Consider MR evaluation        Urinalysis Basic - ( 05 Dec 2024 06:42 )    Color: x / Appearance: x / SG: x / pH: x  Gluc: 97 mg/dL / Ketone: x  / Bili: x / Urobili: x   Blood: x / Protein: x / Nitrite: x   Leuk Esterase: x / RBC: x / WBC x   Sq Epi: x / Non Sq Epi: x / Bacteria: x    MICROBIOLOGY:  Urinalysis with Rflx Culture (collected 02 Dec 2024 20:35)    Culture - Blood (collected 02 Dec 2024 16:20)  Source: .Blood BLOOD  Preliminary Report (04 Dec 2024 21:01):    No growth at 48 Hours    Culture - Blood (collected 02 Dec 2024 16:00)  Source: .Blood BLOOD  Preliminary Report (04 Dec 2024 21:01):    No growth at 48 Hours      Rapid RVP Result: NotDetec (12-03 @ 01:45)       [facility] Division of Hospital Medicine  Britt Zavala MD   Available on TEAMS      12/6/24:  Was made NPO overnight for LP this am.  -pending LP       MEDICATIONS  (STANDING):  azithromycin  IVPB 500 milliGRAM(s) IV Intermittent every 24 hours  cefTRIAXone   IVPB 1000 milliGRAM(s) IV Intermittent every 24 hours  chlorhexidine 2% Cloths 1 Application(s) Topical <User Schedule>    MEDICATIONS  (PRN):  acetaminophen     Tablet .. 650 milliGRAM(s) Oral every 6 hours PRN Temp greater or equal to 38C (100.4F), Mild Pain (1 - 3)  aluminum hydroxide/magnesium hydroxide/simethicone Suspension 30 milliLiter(s) Oral every 4 hours PRN Dyspepsia  LORazepam   Injectable 2 milliGRAM(s) IV Push every 8 hours PRN seizure >3 minutes  melatonin 3 milliGRAM(s) Oral at bedtime PRN Insomnia  ondansetron Injectable 4 milliGRAM(s) IV Push every 8 hours PRN Nausea and/or Vomiting      Vital Signs Last 24 Hrs  T(C): 36.9 (05 Dec 2024 16:48), Max: 37.1 (05 Dec 2024 13:19)  T(F): 98.5 (05 Dec 2024 16:48), Max: 98.7 (05 Dec 2024 13:19)  HR: 91 (05 Dec 2024 16:48) (73 - 98)  BP: 125/77 (05 Dec 2024 16:48) (116/72 - 135/76)  BP(mean): --  RR: 17 (05 Dec 2024 16:48) (17 - 18)  SpO2: 100% (05 Dec 2024 16:48) (99% - 100%)    Parameters below as of 05 Dec 2024 16:48  Patient On (Oxygen Delivery Method): room air    CONSTITUTIONAL: NAD, well-groomed  EYES: PERRLA; conjunctiva and sclera clear  ENMT: Moist oral mucosa, no pharyngeal injection or exudates; normal dentition  NECK: Supple, no palpable masses; no thyromegaly  RESPIRATORY: Normal respiratory effort; lungs are clear to auscultation bilaterally  CARDIOVASCULAR: Regular rate and rhythm, normal S1 and S2, no murmur/rub/gallop; No lower extremity edema; Peripheral pulses are 2+ bilaterally  ABDOMEN: Nontender to palpation, normoactive bowel sounds, no rebound/guarding; No hepatosplenomegaly  MUSCULOSKELETAL:  Normal gait; no clubbing or cyanosis of digits; no joint swelling or tenderness to palpation  PSYCH: A+O to person, place, and time; affect appropriate  NEUROLOGY: CN 2-12 are intact and symmetric; no gross sensory deficits   SKIN: No rashes; no palpable lesions      LABS:                    14.6   10.92 )-----------( 236      ( 06 Dec 2024 05:55 )             44.3     135  |  103  |  13  ----------------------------<  100[H]  4.3   |  21[L]  |  0.75    Ca    9.2      06 Dec 2024 05:55  Phos  3.8     12-06  Mg     2.20     12-06    PT/INR - ( 06 Dec 2024 05:55 )   PT: 12.4 sec;   INR: 1.04 ratio    PTT - ( 06 Dec 2024 05:55 )  PTT:32.2 sec    Vitamin B12, Serum: 212 pg/mL (12.03.24 @ 06:03)   Vitamin D, 25-Hydroxy: 11.1: Deficiency: Less than 20 ng/mL Folate, Serum: 8.2 ng/mL (12.03.24 @ 06:03) Thyroid Stimulating Hormone, Serum: 3.97 uIU/mL (12.03.24 @ 06:03) Respiratory Viral Panel with COVID-19 by SHERWIN (12.03.24 @ 01:45)   Rapid RVP Result: [facility]  SARS-CoV-2: [facility]: This Respiratory Panel uses polymerase chain reaction (PCR) to detect for   adenovirus; coronavirus (HKU1, NL63, 229E, OC43); human metapneumovirus   (hMPV); human enterovirus/rhinovirus (Entero/RV); influenza A; influenza   A/H1; influenza A/H3; influenza A/H1-2009; influenza B; parainfluenza   viruses 1, 2, 3, 4; respiratory syncytial virus; Mycoplasma pneumoniae;   Chlamydophila pneumoniae; and SARS-CoV-2.  Adenovirus (RapRVP): NotDete  Influenza A (RapRVP): NotDete  Influenza B (RapRVP): NotDete  Parainfluenza 1 (RapRVP): NotDetec  Parainfluenza 2 (RapRVP): NotDetec  Parainfluenza 3 (RapRVP): NotDetec  Parainfluenza 4 (RapRVP): NotDetec  Resp Syncytial Virus (RapRVP): NotDetec  Bordetella pertussis (RapRVP): NotDetec  Bordetella parapertussis (RapRVP): NotDetec  Chlamydia pneumoniae (RapRVP): NotDetec  Mycoplasma pneumoniae (RapRVP): NotDetec  Entero/Rhinovirus (RapRVP): NotDetec  HKU1 Coronavirus (RapRVP): NotDetec  NL63 Coronavirus (RapRVP): NotDetec  229E Coronavirus (RapRVP): NotDetec  OC43 Coronavirus (RapRVP): NotDetec  hMPV (RapRVP): NotDetecFluA/FluB/RSV/COVID PCR (12.03.24 @ 01:45)   SARS-CoV-2 Result: NotDete: This Respiratory Panel uses polymerase chain reaction (PCR) to detect for   influenza A; influenza B; respiratory syncytial virus; and SARS-CoV-2.   Test results should be correlated with clinical presentation, patient   history, and epidemiology.  Influenza A Result: NotDete  Influenza B Result: NotDete    Resp Syn Virus Result: NotDetecFTA Syphilis Confirmatory: Reactive: Method: Multiplex flow immunoassay for total treponema pallidum antibody (12.03.24 @ 06:03)  Treponema Pallidum Antibody Interpretation: Positive:       CT Cervical Spine No Cont (12.02.24 @ 19:20) >  IMPRESSION:    CT HEAD:  No acute intracranial hemorrhage, mass effect, or midline shift.    CT CERVICAL SPINE:  No acute fracture or traumatic subluxation.      CT Abdomen and Pelvis w/ IV Cont 12.02.24  IMPRESSION:  No acute intrathoracic, abdominal or pelvic findings.    CT Chest w/ IV Cont 12.02.24   CHEST:  LUNGS AND LARGE AIRWAYS: Patent central airways. Slightly limited   assessment of the pulmonary parenchyma due to respiratory motion   artifact. No pulmonary nodules or parenchymal consolidation. Mild   dependent atelectasis bilaterally.  PLEURA: No pleural effusion.  VESSELS: Within normal limits.  HEART: Heart size is normal. No pericardial effusion.  MEDIASTINUM AND LISA: No lymphadenopathy.  CHEST WALL AND LOWER NECK: Within normal limits.      CT Head No Cont (12.04.24 @ 12:02)   IMPRESSION:   Unremarkable head CT.    No adverse interval change.   Consider MR evaluation        Urinalysis Basic - ( 05 Dec 2024 06:42 )    Color: x / Appearance: x / SG: x / pH: x  Gluc: 97 mg/dL / Ketone: x  / Bili: x / Urobili: x   Blood: x / Protein: x / Nitrite: x   Leuk Esterase: x / RBC: x / WBC x   Sq Epi: x / Non Sq Epi: x / Bacteria: x    MICROBIOLOGY:  Urinalysis with Rflx Culture (collected 02 Dec 2024 20:35)    Culture - Blood (collected 02 Dec 2024 16:20)  Source: .Blood BLOOD  Preliminary Report (04 Dec 2024 21:01):    No growth at 48 Hours    Culture - Blood (collected 02 Dec 2024 16:00)  Source: .Blood BLOOD  Preliminary Report (04 Dec 2024 21:01):    No growth at 48 Hours      Rapid RVP Result: NotDetec (12-03 @ 01:45)       [facility] Division of Hospital Medicine  Britt Zavala MD   Available on TEAMS      12/6/24:  Was made NPO overnight for LP this am.  Pt was evaluate this am after LP was done. Reports no headache, no fever.     MEDICATIONS  (STANDING):  azithromycin  IVPB 500 milliGRAM(s) IV Intermittent every 24 hours  cefTRIAXone   IVPB 1000 milliGRAM(s) IV Intermittent every 24 hours  chlorhexidine 2% Cloths 1 Application(s) Topical <User Schedule>    MEDICATIONS  (PRN):  acetaminophen     Tablet .. 650 milliGRAM(s) Oral every 6 hours PRN Temp greater or equal to 38C (100.4F), Mild Pain (1 - 3)  aluminum hydroxide/magnesium hydroxide/simethicone Suspension 30 milliLiter(s) Oral every 4 hours PRN Dyspepsia  LORazepam   Injectable 2 milliGRAM(s) IV Push every 8 hours PRN seizure >3 minutes  melatonin 3 milliGRAM(s) Oral at bedtime PRN Insomnia  ondansetron Injectable 4 milliGRAM(s) IV Push every 8 hours PRN Nausea and/or Vomiting      Vital Signs Last 24 Hrs  T(C): 36.6 (06 Dec 2024 17:00), Max: 37 (06 Dec 2024 03:20)  T(F): 97.8 (06 Dec 2024 17:00), Max: 98.6 (06 Dec 2024 03:20)  HR: 88 (06 Dec 2024 17:00) (71 - 90)  BP: 132/72 (06 Dec 2024 17:00) (113/60 - 140/90)  BP(mean): --  RR: 18 (06 Dec 2024 17:00) (17 - 18)  SpO2: 100% (06 Dec 2024 17:00) (99% - 100%)    Parameters below as of 06 Dec 2024 17:00  Patient On (Oxygen Delivery Method): room air    CONSTITUTIONAL: NAD, well-groomed  EYES: PERRLA; conjunctiva and sclera clear  ENMT: Moist oral mucosa, no pharyngeal injection or exudates; normal dentition  NECK: Supple, no palpable masses; no thyromegaly  RESPIRATORY: Normal respiratory effort; lungs are clear to auscultation bilaterally  CARDIOVASCULAR: Regular rate and rhythm, normal S1 and S2, no murmur/rub/gallop; No lower extremity edema; Peripheral pulses are 2+ bilaterally  ABDOMEN: Nontender to palpation, normoactive bowel sounds, no rebound/guarding; No hepatosplenomegaly  MUSCULOSKELETAL:  Normal gait; no clubbing or cyanosis of digits; no joint swelling or tenderness to palpation  PSYCH: A+O to person, place, and time; affect appropriate  NEUROLOGY: CN 2-12 are intact and symmetric; no gross sensory deficits   SKIN: No rashes; no palpable lesions      LABS:                    14.6   10.92 )-----------( 236      ( 06 Dec 2024 05:55 )             44.3     135  |  103  |  13  ----------------------------<  100[H]  4.3   |  21[L]  |  0.75    Ca    9.2      06 Dec 2024 05:55  Phos  3.8     12-06  Mg     2.20     12-06    PT/INR - ( 06 Dec 2024 05:55 )   PT: 12.4 sec;   INR: 1.04 ratio    PTT - ( 06 Dec 2024 05:55 )  PTT:32.2 sec    Vitamin B12, Serum: 212 pg/mL (12.03.24 @ 06:03)   Vitamin D, 25-Hydroxy: 11.1: Deficiency: Less than 20 ng/mL Folate, Serum: 8.2 ng/mL (12.03.24 @ 06:03) Thyroid Stimulating Hormone, Serum: 3.97 uIU/mL (12.03.24 @ 06:03) Respiratory Viral Panel with COVID-19 by SHERWIN (12.03.24 @ 01:45)   Rapid RVP Result: [facility]  SARS-CoV-2: [facility]: This Respiratory Panel uses polymerase chain reaction (PCR) to detect for   adenovirus; coronavirus (HKU1, NL63, 229E, OC43); human metapneumovirus   (hMPV); human enterovirus/rhinovirus (Entero/RV); influenza A; influenza   A/H1; influenza A/H3; influenza A/H1-2009; influenza B; parainfluenza   viruses 1, 2, 3, 4; respiratory syncytial virus; Mycoplasma pneumoniae;   Chlamydophila pneumoniae; and SARS-CoV-2.  [facility]  [facility]  Influenza B (RapRVP): NotDetec  Parainfluenza 1 (RapRVP): NotDetec  Parainfluenza 2 (RapRVP): NotDetec  Parainfluenza 3 (RapRVP): NotDetec  Parainfluenza 4 (RapRVP): NotDetec  Resp Syncytial Virus (RapRVP): NotDetec  Bordetella pertussis (RapRVP): NotDetec  Bordetella parapertussis (RapRVP): NotDetec  Chlamydia pneumoniae (RapRVP): NotDetec  Mycoplasma pneumoniae (RapRVP): NotDetec  Entero/Rhinovirus (RapRVP): NotDetec  HKU1 Coronavirus (RapRVP): NotDetec  NL63 Coronavirus (RapRVP): NotDetec  229E Coronavirus (RapRVP): NotDetec  OC43 Coronavirus (RapRVP): NotDetec  hMPV (RapRVP): NotDetecFluA/FluB/RSV/COVID PCR (12.03.24 @ 01:45)   SARS-CoV-2 Result: NotDete: This Respiratory Panel uses polymerase chain reaction (PCR) to detect for   influenza A; influenza B; respiratory syncytial virus; and SARS-CoV-2.   Test results should be correlated with clinical presentation, patient   history, and epidemiology.  Influenza A Result: NotDetec  Influenza B Result: NotDetec    Resp Syn Virus Result: NotDetecFTA Syphilis Confirmatory: Reactive: Method: Multiplex flow immunoassay for total treponema pallidum antibody (12.03.24 @ 06:03)  Treponema Pallidum Antibody Interpretation: Positive:       CT Cervical Spine No Cont (12.02.24 @ 19:20) >  IMPRESSION:    CT HEAD:  No acute intracranial hemorrhage, mass effect, or midline shift.    CT CERVICAL SPINE:  No acute fracture or traumatic subluxation.      CT Abdomen and Pelvis w/ IV Cont 12.02.24  IMPRESSION:  No acute intrathoracic, abdominal or pelvic findings.    CT Chest w/ IV Cont 12.02.24   CHEST:  LUNGS AND LARGE AIRWAYS: Patent central airways. Slightly limited   assessment of the pulmonary parenchyma due to respiratory motion   artifact. No pulmonary nodules or parenchymal consolidation. Mild   dependent atelectasis bilaterally.  PLEURA: No pleural effusion.  VESSELS: Within normal limits.  HEART: Heart size is normal. No pericardial effusion.  MEDIASTINUM AND LISA: No lymphadenopathy.  CHEST WALL AND LOWER NECK: Within normal limits.      CT Head No Cont (12.04.24 @ 12:02)   IMPRESSION:   Unremarkable head CT.    No adverse interval change.   Consider MR evaluation        Urinalysis Basic - ( 05 Dec 2024 06:42 )    Color: x / Appearance: x / SG: x / pH: x  Gluc: 97 mg/dL / Ketone: x  / Bili: x / Urobili: x   Blood: x / Protein: x / Nitrite: x   Leuk Esterase: x / RBC: x / WBC x   Sq Epi: x / Non Sq Epi: x / Bacteria: x    MICROBIOLOGY:  Urinalysis with Rflx Culture (collected 02 Dec 2024 20:35)    Culture - Blood (collected 02 Dec 2024 16:20)  Source: .Blood BLOOD  Preliminary Report (04 Dec 2024 21:01):    No growth at 48 Hours    Culture - Blood (collected 02 Dec 2024 16:00)  Source: .Blood BLOOD  Preliminary Report (04 Dec 2024 21:01):    No growth at 48 Hours      Rapid RVP Result: NotDetec (12-03 @ 01:45)

## 2024-12-06 NOTE — PROGRESS NOTE ADULT - SUBJECTIVE AND OBJECTIVE BOX
19yPatient is a 19y old  Male who presents with a chief complaint of possible seizure (06 Dec 2024 09:52)      Interval history:  Afebrile, s/p LP, headache little better.      Allergies:   No Known Allergies    Antimicrobials:  cefTRIAXone   IVPB 1000 milliGRAM(s) IV Intermittent every 24 hours      REVIEW OF SYSTEMS:  No chest pain   No SOB  No abdominal pain  No rash.       Vital Signs Last 24 Hrs  T(C): 36.6 (12-06-24 @ 17:00), Max: 37 (12-06-24 @ 03:20)  T(F): 97.8 (12-06-24 @ 17:00), Max: 98.6 (12-06-24 @ 03:20)  HR: 88 (12-06-24 @ 17:00) (71 - 90)  BP: 132/72 (12-06-24 @ 17:00) (113/60 - 140/90)  BP(mean): --  RR: 18 (12-06-24 @ 17:00) (17 - 18)  SpO2: 100% (12-06-24 @ 17:00) (100% - 100%)    PHYSICAL EXAM:  Pt in no acute distress, alert, awake.   breathing comfortably   non distended abdomen  no edema LE   no phlebitis                             14.6   10.92 )-----------( 236      ( 06 Dec 2024 05:55 )             44.3   12-06    135  |  103  |  13  ----------------------------<  100[H]  4.3   |  21[L]  |  0.75    Ca    9.2      06 Dec 2024 05:55  Phos  3.8     12-06  Mg     2.20     12-06        Lactate Dehydrogenase, CSF (12.06.24 @ 11:00)   Lactate Dehydrogenase, CSF: 14:    Protein, CSF (12.06.24 @ 11:00)   Protein, CSF: 30 mg/dL    Glucose, CSF (12.06.24 @ 11:00)   Glucose, CSF: 64 mg/dL    Cerebrospinal Fluid Cell Count-1 (12.06.24 @ 11:00)   Tube Type: Tube 3  Other Cells - Spinal Fluid: 0 %  RBC Count - Spinal Fluid: 1: Red Cell count correlates with the number and proportion of cells on   cytospin preparation. cells/uL  CSF Color: Colorless  Eosinophil Count - Spinal Fluid: 0 %  CSF Appearance: Clear  CSF Lymphocytes: 51 %  CSF Monocytes/Macrophages: 15 %  CSF Neutrophils: 0 %  Atypical Lymphocytes - CSF: 0 %  Total Cells Counted, Spinal Fluid: 66 Cells  Total Nucleated Cell Count, CSF: 1 cells/uL      Culture - CSF with Gram Stain (collected 06 Dec 2024 11:52)  Source: .CSF CSF  Gram Stain (06 Dec 2024 15:06):    No polymorphonuclear cells seen    No organisms seen    by cytocentrifuge

## 2024-12-06 NOTE — PROGRESS NOTE ADULT - PROBLEM SELECTOR PLAN 4
-email sent to pharmacy Appreciate neurology eval.  Possible PNES  No seizures on EEG, ok to discontinue  - obtain MR Brain w/wo ordered, pending

## 2024-12-06 NOTE — PROGRESS NOTE ADULT - ASSESSMENT
19M hx schizophrenia vs. bipolar vs. schizoaff, on Zyprexa but recently ran out, comes to ED for waxing-waning headache.    Assessment:  #Headaches  #Leucocytosis   #Syphilis screen positive    -Pt afebrile with elevated WBC on admission  -Treponema pallidum ab +, FTA reactive, RPR <1:1  -BCX 12/2 NGTD  -CT head neg, CT C/A/P unremarkable  -CRP <3      Recommendation:  -Pt with severe headaches with + FTA confirmatory test  s/p LP, CSF studies not consistent with infectious etiology.  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   neuro on board    Aj Morales  Please contact through MS Teams   If no response or past 5 pm/weekend call [phone].

## 2024-12-06 NOTE — PROGRESS NOTE ADULT - PROBLEM SELECTOR PLAN 2
Appreciate neurology eval.  Possible PNES  No seizures on EEG, ok to discontinue  - obtain MR Brain w/wo ordered, pending - Vit D level 11  - start oral supplementation

## 2024-12-07 LAB
ANION GAP SERPL CALC-SCNC: 14 MMOL/L — SIGNIFICANT CHANGE UP (ref 7–14)
BASOPHILS # BLD AUTO: 0.04 K/UL — SIGNIFICANT CHANGE UP (ref 0–0.2)
BASOPHILS NFR BLD AUTO: 0.4 % — SIGNIFICANT CHANGE UP (ref 0–2)
BUN SERPL-MCNC: 12 MG/DL — SIGNIFICANT CHANGE UP (ref 7–23)
CALCIUM SERPL-MCNC: 9.1 MG/DL — SIGNIFICANT CHANGE UP (ref 8.4–10.5)
CHLORIDE SERPL-SCNC: 102 MMOL/L — SIGNIFICANT CHANGE UP (ref 98–107)
CO2 SERPL-SCNC: 22 MMOL/L — SIGNIFICANT CHANGE UP (ref 22–31)
CREAT SERPL-MCNC: 0.72 MG/DL — SIGNIFICANT CHANGE UP (ref 0.5–1.3)
CRYPTOC AG CSF-ACNC: NEGATIVE — SIGNIFICANT CHANGE UP
CULTURE RESULTS: SIGNIFICANT CHANGE UP
CULTURE RESULTS: SIGNIFICANT CHANGE UP
EGFR: 134 ML/MIN/1.73M2 — SIGNIFICANT CHANGE UP
EOSINOPHIL # BLD AUTO: 0.29 K/UL — SIGNIFICANT CHANGE UP (ref 0–0.5)
EOSINOPHIL NFR BLD AUTO: 2.9 % — SIGNIFICANT CHANGE UP (ref 0–6)
ERYTHROCYTE [SEDIMENTATION RATE] IN BLOOD: 5 MM/HR — SIGNIFICANT CHANGE UP (ref 1–15)
GLUCOSE SERPL-MCNC: 137 MG/DL — HIGH (ref 70–99)
HCT VFR BLD CALC: 42.7 % — SIGNIFICANT CHANGE UP (ref 39–50)
HGB BLD-MCNC: 14.3 G/DL — SIGNIFICANT CHANGE UP (ref 13–17)
HIV 1+2 AB+HIV1 P24 AG SERPL QL IA: SIGNIFICANT CHANGE UP
IANC: 4.87 K/UL — SIGNIFICANT CHANGE UP (ref 1.8–7.4)
IMM GRANULOCYTES NFR BLD AUTO: 0.9 % — SIGNIFICANT CHANGE UP (ref 0–0.9)
LYMPHOCYTES # BLD AUTO: 3.79 K/UL — HIGH (ref 1–3.3)
LYMPHOCYTES # BLD AUTO: 38.3 % — SIGNIFICANT CHANGE UP (ref 13–44)
MAGNESIUM SERPL-MCNC: 2.2 MG/DL — SIGNIFICANT CHANGE UP (ref 1.6–2.6)
MCHC RBC-ENTMCNC: 28.1 PG — SIGNIFICANT CHANGE UP (ref 27–34)
MCHC RBC-ENTMCNC: 33.5 G/DL — SIGNIFICANT CHANGE UP (ref 32–36)
MCV RBC AUTO: 84.1 FL — SIGNIFICANT CHANGE UP (ref 80–100)
MONOCYTES # BLD AUTO: 0.81 K/UL — SIGNIFICANT CHANGE UP (ref 0–0.9)
MONOCYTES NFR BLD AUTO: 8.2 % — SIGNIFICANT CHANGE UP (ref 2–14)
NEUTROPHILS # BLD AUTO: 4.87 K/UL — SIGNIFICANT CHANGE UP (ref 1.8–7.4)
NEUTROPHILS NFR BLD AUTO: 49.3 % — SIGNIFICANT CHANGE UP (ref 43–77)
NRBC # BLD: 0 /100 WBCS — SIGNIFICANT CHANGE UP (ref 0–0)
NRBC # FLD: 0 K/UL — SIGNIFICANT CHANGE UP (ref 0–0)
PHOSPHATE SERPL-MCNC: 4 MG/DL — SIGNIFICANT CHANGE UP (ref 2.5–4.5)
PLATELET # BLD AUTO: 245 K/UL — SIGNIFICANT CHANGE UP (ref 150–400)
POTASSIUM SERPL-MCNC: 3.7 MMOL/L — SIGNIFICANT CHANGE UP (ref 3.5–5.3)
POTASSIUM SERPL-SCNC: 3.7 MMOL/L — SIGNIFICANT CHANGE UP (ref 3.5–5.3)
RBC # BLD: 5.08 M/UL — SIGNIFICANT CHANGE UP (ref 4.2–5.8)
RBC # FLD: 12.2 % — SIGNIFICANT CHANGE UP (ref 10.3–14.5)
SODIUM SERPL-SCNC: 138 MMOL/L — SIGNIFICANT CHANGE UP (ref 135–145)
SPECIMEN SOURCE: SIGNIFICANT CHANGE UP
SPECIMEN SOURCE: SIGNIFICANT CHANGE UP
WBC # BLD: 9.89 K/UL — SIGNIFICANT CHANGE UP (ref 3.8–10.5)
WBC # FLD AUTO: 9.89 K/UL — SIGNIFICANT CHANGE UP (ref 3.8–10.5)

## 2024-12-07 PROCEDURE — 99232 SBSQ HOSP IP/OBS MODERATE 35: CPT

## 2024-12-07 RX ORDER — ERGOCALCIFEROL (VITAMIN D2) 200 MCG/ML
50000 DROPS ORAL ONCE
Refills: 0 | Status: COMPLETED | OUTPATIENT
Start: 2024-12-07 | End: 2024-12-07

## 2024-12-07 RX ADMIN — Medication 2.4 MILLION UNIT(S): at 14:35

## 2024-12-07 RX ADMIN — ACETAMINOPHEN 500MG 650 MILLIGRAM(S): 500 TABLET, COATED ORAL at 22:01

## 2024-12-07 RX ADMIN — Medication 1000 MICROGRAM(S): at 14:35

## 2024-12-07 RX ADMIN — CHLORHEXIDINE GLUCONATE 1 APPLICATION(S): 1.2 RINSE ORAL at 04:59

## 2024-12-07 RX ADMIN — ACETAMINOPHEN 500MG 650 MILLIGRAM(S): 500 TABLET, COATED ORAL at 14:15

## 2024-12-07 RX ADMIN — Medication 50000 UNIT(S): at 14:35

## 2024-12-07 RX ADMIN — ACETAMINOPHEN 500MG 650 MILLIGRAM(S): 500 TABLET, COATED ORAL at 13:45

## 2024-12-07 NOTE — PROGRESS NOTE ADULT - SUBJECTIVE AND OBJECTIVE BOX
[facility] Division of Hospital Medicine  Britt Zavala MD   Available on TEAMS      12/7/24:  CSF studies with low suspicion for an infectious process  Start treatment for latent Syphilis   -pending MRI brain    12/6/24:  Was made NPO overnight for LP this am.  Pt was evaluate this am after LP was done. Reports no headache, no fever.     MEDICATIONS  (STANDING):  cefTRIAXone   IVPB 1000 milliGRAM(s) IV Intermittent every 24 hours  chlorhexidine 2% Cloths 1 Application(s) Topical <User Schedule>    MEDICATIONS  (PRN):  acetaminophen     Tablet .. 650 milliGRAM(s) Oral every 6 hours PRN Temp greater or equal to 38C (100.4F), Mild Pain (1 - 3)  aluminum hydroxide/magnesium hydroxide/simethicone Suspension 30 milliLiter(s) Oral every 4 hours PRN Dyspepsia  LORazepam   Injectable 2 milliGRAM(s) IV Push every 8 hours PRN seizure >3 minutes  melatonin 3 milliGRAM(s) Oral at bedtime PRN Insomnia  ondansetron Injectable 4 milliGRAM(s) IV Push every 8 hours PRN Nausea and/or Vomiting    Vital Signs Last 24 Hrs  T(C): 36.6 (07 Dec 2024 05:00), Max: 36.8 (06 Dec 2024 21:00)  T(F): 97.8 (07 Dec 2024 05:00), Max: 98.2 (06 Dec 2024 21:00)  HR: 77 (07 Dec 2024 05:00) (72 - 88)  BP: 123/69 (07 Dec 2024 05:00) (114/52 - 132/72)  BP(mean): --  RR: 18 (07 Dec 2024 05:00) (17 - 18)  SpO2: 100% (07 Dec 2024 05:00) (100% - 100%)    Parameters below as of 07 Dec 2024 05:00  Patient On (Oxygen Delivery Method): room air        CONSTITUTIONAL: NAD, well-groomed  EYES: PERRLA; conjunctiva and sclera clear  ENMT: Moist oral mucosa, no pharyngeal injection or exudates; normal dentition  NECK: Supple, no palpable masses; no thyromegaly  RESPIRATORY: Normal respiratory effort; lungs are clear to auscultation bilaterally  CARDIOVASCULAR: Regular rate and rhythm, normal S1 and S2, no murmur/rub/gallop; No lower extremity edema; Peripheral pulses are 2+ bilaterally  ABDOMEN: Nontender to palpation, normoactive bowel sounds, no rebound/guarding; No hepatosplenomegaly  MUSCULOSKELETAL:  Normal gait; no clubbing or cyanosis of digits; no joint swelling or tenderness to palpation  PSYCH: A+O to person, place, and time; affect appropriate  NEUROLOGY: CN 2-12 are intact and symmetric; no gross sensory deficits   SKIN: No rashes; no palpable lesions      LABS:                    14.3   9.89  )-----------( 245      ( 07 Dec 2024 04:55 )             42.7     138  |  102  |  12  ----------------------------<  137[H]  3.7   |  22  |  0.72    Ca    9.1      07 Dec 2024 04:55  Phos  4.0     12-07  Mg     2.20     12-07      PT/INR - ( 06 Dec 2024 05:55 )   PT: 12.4 sec;   INR: 1.04 ratio         PTT - ( 06 Dec 2024 05:55 )  PTT:32.2 sec      LABS:                    14.6   10.92 )-----------( 236      ( 06 Dec 2024 05:55 )             44.3     135  |  103  |  13  ----------------------------<  100[H]  4.3   |  21[L]  |  0.75    Ca    9.2      06 Dec 2024 05:55  Phos  3.8     12-06  Mg     2.20     12-06    PT/INR - ( 06 Dec 2024 05:55 )   PT: 12.4 sec;   INR: 1.04 ratio    PTT - ( 06 Dec 2024 05:55 )  PTT:32.2 sec    Vitamin B12, Serum: 212 pg/mL (12.03.24 @ 06:03)   Vitamin D, 25-Hydroxy: 11.1: Deficiency: Less than 20 ng/mL Folate, Serum: 8.2 ng/mL (12.03.24 @ 06:03) Thyroid Stimulating Hormone, Serum: 3.97 uIU/mL (12.03.24 @ 06:03) Respiratory Viral Panel with COVID-19 by SHERWIN (12.03.24 @ 01:45)   Rapid RVP Result: NotDetec  SARS-CoV-2: NotDetec: This Respiratory Panel uses polymerase chain reaction (PCR) to detect for   adenovirus; coronavirus (HKU1, NL63, 229E, OC43); human metapneumovirus   (hMPV); human enterovirus/rhinovirus (Entero/RV); influenza A; influenza   A/H1; influenza A/H3; influenza A/H1-2009; influenza B; parainfluenza   viruses 1, 2, 3, 4; respiratory syncytial virus; Mycoplasma pneumoniae;   Chlamydophila pneumoniae; and SARS-CoV-2.  Adenovirus (RapRVP): NotDetec  Influenza A (RapRVP): NotDetec  Influenza B (RapRVP): NotDetec  Parainfluenza 1 (RapRVP): NotDetec  Parainfluenza 2 (RapRVP): NotDetec  Parainfluenza 3 (RapRVP): NotDetec  Parainfluenza 4 (RapRVP): NotDetec  Resp Syncytial Virus (RapRVP): NotDetec  Bordetella pertussis (RapRVP): NotDetec  Bordetella parapertussis (RapRVP): NotDetec  Chlamydia pneumoniae (RapRVP): NotDetec  Mycoplasma pneumoniae (RapRVP): NotDetec  Entero/Rhinovirus (RapRVP): NotDetec  HKU1 Coronavirus (RapRVP): NotDetec  NL63 Coronavirus (RapRVP): NotDetec  229E Coronavirus (RapRVP): NotDetec  OC43 Coronavirus (RapRVP): NotDetec  hMPV (RapRVP): NotDetecFluA/FluB/RSV/COVID PCR (12.03.24 @ 01:45)   SARS-CoV-2 Result: NotDetec: This Respiratory Panel uses polymerase chain reaction (PCR) to detect for   influenza A; influenza B; respiratory syncytial virus; and SARS-CoV-2.   Test results should be correlated with clinical presentation, patient   history, and epidemiology.  Influenza A Result: NotDetec  Influenza B Result: NotDetec    Resp Syn Virus Result: NotDetecFTA Syphilis Confirmatory: Reactive: Method: Multiplex flow immunoassay for total treponema pallidum antibody (12.03.24 @ 06:03)  Treponema Pallidum Antibody Interpretation: Positive:       CT Cervical Spine No Cont (12.02.24 @ 19:20) >  IMPRESSION:    CT HEAD:  No acute intracranial hemorrhage, mass effect, or midline shift.    CT CERVICAL SPINE:  No acute fracture or traumatic subluxation.      CT Abdomen and Pelvis w/ IV Cont 12.02.24  IMPRESSION:  No acute intrathoracic, abdominal or pelvic findings.    CT Chest w/ IV Cont 12.02.24   CHEST:  LUNGS AND LARGE AIRWAYS: Patent central airways. Slightly limited   assessment of the pulmonary parenchyma due to respiratory motion   artifact. No pulmonary nodules or parenchymal consolidation. Mild   dependent atelectasis bilaterally.  PLEURA: No pleural effusion.  VESSELS: Within normal limits.  HEART: Heart size is normal. No pericardial effusion.  MEDIASTINUM AND LISA: No lymphadenopathy.  CHEST WALL AND LOWER NECK: Within normal limits.      CT Head No Cont (12.04.24 @ 12:02)   IMPRESSION:   Unremarkable head CT.    No adverse interval change.   Consider MR evaluation        Urinalysis Basic - ( 05 Dec 2024 06:42 )    Color: x / Appearance: x / SG: x / pH: x  Gluc: 97 mg/dL / Ketone: x  / Bili: x / Urobili: x   Blood: x / Protein: x / Nitrite: x   Leuk Esterase: x / RBC: x / WBC x   Sq Epi: x / Non Sq Epi: x / Bacteria: x    MICROBIOLOGY:  Urinalysis with Rflx Culture (collected 02 Dec 2024 20:35)    Culture - Blood (collected 02 Dec 2024 16:20)  Source: .Blood BLOOD  Preliminary Report (04 Dec 2024 21:01):    No growth at 48 Hours    Culture - Blood (collected 02 Dec 2024 16:00)  Source: .Blood BLOOD  Preliminary Report (04 Dec 2024 21:01):    No growth at 48 Hours      Rapid RVP Result: NotDetec (12-03 @ 01:45)       [facility] Division of Hospital Medicine  Britt Zavala MD   Available on TEAMS      12/7/24:  Reports no headache, no fever.   CSF studies with low suspicion for an infectious process  Start treatment for latent Syphilis   -pending MRI brain    MEDICATIONS  (STANDING):  cefTRIAXone   IVPB 1000 milliGRAM(s) IV Intermittent every 24 hours  chlorhexidine 2% Cloths 1 Application(s) Topical <User Schedule>    MEDICATIONS  (PRN):  acetaminophen     Tablet .. 650 milliGRAM(s) Oral every 6 hours PRN Temp greater or equal to 38C (100.4F), Mild Pain (1 - 3)  aluminum hydroxide/magnesium hydroxide/simethicone Suspension 30 milliLiter(s) Oral every 4 hours PRN Dyspepsia  LORazepam   Injectable 2 milliGRAM(s) IV Push every 8 hours PRN seizure >3 minutes  melatonin 3 milliGRAM(s) Oral at bedtime PRN Insomnia  ondansetron Injectable 4 milliGRAM(s) IV Push every 8 hours PRN Nausea and/or Vomiting    Vital Signs Last 24 Hrs  T(C): 36.6 (07 Dec 2024 05:00), Max: 36.8 (06 Dec 2024 21:00)  T(F): 97.8 (07 Dec 2024 05:00), Max: 98.2 (06 Dec 2024 21:00)  HR: 77 (07 Dec 2024 05:00) (72 - 88)  BP: 123/69 (07 Dec 2024 05:00) (114/52 - 132/72)  BP(mean): --  RR: 18 (07 Dec 2024 05:00) (17 - 18)  SpO2: 100% (07 Dec 2024 05:00) (100% - 100%)    Parameters below as of 07 Dec 2024 05:00  Patient On (Oxygen Delivery Method): room air        CONSTITUTIONAL: NAD, well-groomed  EYES: PERRLA; conjunctiva and sclera clear  ENMT: Moist oral mucosa, no pharyngeal injection or exudates; normal dentition  NECK: Supple, no palpable masses; no thyromegaly  RESPIRATORY: Normal respiratory effort; lungs are clear to auscultation bilaterally  CARDIOVASCULAR: Regular rate and rhythm, normal S1 and S2, no murmur/rub/gallop; No lower extremity edema; Peripheral pulses are 2+ bilaterally  ABDOMEN: Nontender to palpation, normoactive bowel sounds, no rebound/guarding; No hepatosplenomegaly  MUSCULOSKELETAL:  Normal gait; no clubbing or cyanosis of digits; no joint swelling or tenderness to palpation  PSYCH: A+O to person, place, and time; affect appropriate  NEUROLOGY: CN 2-12 are intact and symmetric; no gross sensory deficits   SKIN: No rashes; no palpable lesions      LABS:                    14.3   9.89  )-----------( 245      ( 07 Dec 2024 04:55 )             42.7     138  |  102  |  12  ----------------------------<  137[H]  3.7   |  22  |  0.72    Ca    9.1      07 Dec 2024 04:55  Phos  4.0     12-07  Mg     2.20     12-07      PT/INR - ( 06 Dec 2024 05:55 )   PT: 12.4 sec;   INR: 1.04 ratio         PTT - ( 06 Dec 2024 05:55 )  PTT:32.2 sec      LABS:                    14.6   10.92 )-----------( 236      ( 06 Dec 2024 05:55 )             44.3     135  |  103  |  13  ----------------------------<  100[H]  4.3   |  21[L]  |  0.75    Ca    9.2      06 Dec 2024 05:55  Phos  3.8     12-06  Mg     2.20     12-06    PT/INR - ( 06 Dec 2024 05:55 )   PT: 12.4 sec;   INR: 1.04 ratio    PTT - ( 06 Dec 2024 05:55 )  PTT:32.2 sec    Vitamin B12, Serum: 212 pg/mL (12.03.24 @ 06:03)   Vitamin D, 25-Hydroxy: 11.1: Deficiency: Less than 20 ng/mL Folate, Serum: 8.2 ng/mL (12.03.24 @ 06:03) Thyroid Stimulating Hormone, Serum: 3.97 uIU/mL (12.03.24 @ 06:03) Respiratory Viral Panel with COVID-19 by SHERWIN (12.03.24 @ 01:45)   Rapid RVP Result: [facility]  SARS-CoV-2: NotDetec: This Respiratory Panel uses polymerase chain reaction (PCR) to detect for   adenovirus; coronavirus (HKU1, NL63, 229E, OC43); human metapneumovirus   (hMPV); human enterovirus/rhinovirus (Entero/RV); influenza A; influenza   A/H1; influenza A/H3; influenza A/H1-2009; influenza B; parainfluenza   viruses 1, 2, 3, 4; respiratory syncytial virus; Mycoplasma pneumoniae;   Chlamydophila pneumoniae; and SARS-CoV-2.  Adenovirus (RapRVP): NotDetec  Influenza A (RapRVP): NotDetec  Influenza B (RapRVP): NotDetec  Parainfluenza 1 (RapRVP): NotDetec  Parainfluenza 2 (RapRVP): NotDetec  Parainfluenza 3 (RapRVP): NotDetec  Parainfluenza 4 (RapRVP): NotDetec  Resp Syncytial Virus (RapRVP): NotDetec  Bordetella pertussis (RapRVP): NotDetec  Bordetella parapertussis (RapRVP): NotDetec  Chlamydia pneumoniae (RapRVP): NotDetec  Mycoplasma pneumoniae (RapRVP): NotDetec  Entero/Rhinovirus (RapRVP): NotDetec  HKU1 Coronavirus (RapRVP): NotDetec  NL63 Coronavirus (RapRVP): NotDetec  229E Coronavirus (RapRVP): NotDetec  OC43 Coronavirus (RapRVP): NotDetec  hMPV (RapRVP): NotDetecFluA/FluB/RSV/COVID PCR (12.03.24 @ 01:45)   SARS-CoV-2 Result: NotDetec: This Respiratory Panel uses polymerase chain reaction (PCR) to detect for   influenza A; influenza B; respiratory syncytial virus; and SARS-CoV-2.   Test results should be correlated with clinical presentation, patient   history, and epidemiology.  Influenza A Result: NotDetec  Influenza B Result: NotDetec    Resp Syn Virus Result: NotDetecFTA Syphilis Confirmatory: Reactive: Method: Multiplex flow immunoassay for total treponema pallidum antibody (12.03.24 @ 06:03)  Treponema Pallidum Antibody Interpretation: Positive:       CT Cervical Spine No Cont (12.02.24 @ 19:20) >  IMPRESSION:    CT HEAD:  No acute intracranial hemorrhage, mass effect, or midline shift.    CT CERVICAL SPINE:  No acute fracture or traumatic subluxation.      CT Abdomen and Pelvis w/ IV Cont 12.02.24  IMPRESSION:  No acute intrathoracic, abdominal or pelvic findings.    CT Chest w/ IV Cont 12.02.24   CHEST:  LUNGS AND LARGE AIRWAYS: Patent central airways. Slightly limited   assessment of the pulmonary parenchyma due to respiratory motion   artifact. No pulmonary nodules or parenchymal consolidation. Mild   dependent atelectasis bilaterally.  PLEURA: No pleural effusion.  VESSELS: Within normal limits.  HEART: Heart size is normal. No pericardial effusion.  MEDIASTINUM AND LISA: No lymphadenopathy.  CHEST WALL AND LOWER NECK: Within normal limits.      CT Head No Cont (12.04.24 @ 12:02)   IMPRESSION:   Unremarkable head CT.    No adverse interval change.   Consider MR evaluation        Urinalysis Basic - ( 05 Dec 2024 06:42 )    Color: x / Appearance: x / SG: x / pH: x  Gluc: 97 mg/dL / Ketone: x  / Bili: x / Urobili: x   Blood: x / Protein: x / Nitrite: x   Leuk Esterase: x / RBC: x / WBC x   Sq Epi: x / Non Sq Epi: x / Bacteria: x    MICROBIOLOGY:  Urinalysis with Rflx Culture (collected 02 Dec 2024 20:35)    Culture - Blood (collected 02 Dec 2024 16:20)  Source: .Blood BLOOD  Preliminary Report (04 Dec 2024 21:01):    No growth at 48 Hours    Culture - Blood (collected 02 Dec 2024 16:00)  Source: .Blood BLOOD  Preliminary Report (04 Dec 2024 21:01):    No growth at 48 Hours      Rapid RVP Result: NotDetec (12-03 @ 01:45)

## 2024-12-07 NOTE — PROGRESS NOTE ADULT - PROBLEM SELECTOR PLAN 1
Possibly 2/2 neurosyphilis. Appreciate ID recommendations.   Pt with severe headaches with + FTA confirmatory test, concerning for neurosyphillis. s/p LP 12/6/24 to r/o neurosyphillis.   - f/u LP with CSF glucose, CSF protein, CSF cell count, CSF PCR, HSV PCR, VDLR titer and CSF bacterial/fungal/viral culture - not consistent with infectious process.   - please discontinue CTX  - start treatment for latent syphilis with PCN 2.4 MU im x once a week for 3 doses   - f/u HIV, GC/Chlamydia, ESR, Hep B and C serology   - Cont to monitor fever and WBC curve  - headaches improving

## 2024-12-07 NOTE — PROGRESS NOTE ADULT - ASSESSMENT
19M hx schizophrenia vs. bipolar vs. schizoaff, on Zyprexa but recently ran out, comes to ED for waxing-waning headache. Neuro initially concerned for structural vs. infectious vs. metabolic vs. epileptic cause, pt unable to tolerate LP x 2. No seizure on EEG. Low concern for meningitis,  CSF studies not consistent with acute infectious process, pending further work up with MRI brain, starting treatment for latent syphilis with PCN 2.4 MU im x once a week for 3 doses.

## 2024-12-08 LAB
ANION GAP SERPL CALC-SCNC: 13 MMOL/L — SIGNIFICANT CHANGE UP (ref 7–14)
BASOPHILS # BLD AUTO: 0.05 K/UL — SIGNIFICANT CHANGE UP (ref 0–0.2)
BASOPHILS NFR BLD AUTO: 0.4 % — SIGNIFICANT CHANGE UP (ref 0–2)
BUN SERPL-MCNC: 11 MG/DL — SIGNIFICANT CHANGE UP (ref 7–23)
CALCIUM SERPL-MCNC: 9.4 MG/DL — SIGNIFICANT CHANGE UP (ref 8.4–10.5)
CHLORIDE SERPL-SCNC: 100 MMOL/L — SIGNIFICANT CHANGE UP (ref 98–107)
CO2 SERPL-SCNC: 22 MMOL/L — SIGNIFICANT CHANGE UP (ref 22–31)
CREAT SERPL-MCNC: 0.74 MG/DL — SIGNIFICANT CHANGE UP (ref 0.5–1.3)
EGFR: 133 ML/MIN/1.73M2 — SIGNIFICANT CHANGE UP
EOSINOPHIL # BLD AUTO: 0.24 K/UL — SIGNIFICANT CHANGE UP (ref 0–0.5)
EOSINOPHIL NFR BLD AUTO: 1.9 % — SIGNIFICANT CHANGE UP (ref 0–6)
GLUCOSE SERPL-MCNC: 88 MG/DL — SIGNIFICANT CHANGE UP (ref 70–99)
HBV SURFACE AG SER-ACNC: SIGNIFICANT CHANGE UP
HCT VFR BLD CALC: 43.7 % — SIGNIFICANT CHANGE UP (ref 39–50)
HCV AB S/CO SERPL IA: 0.19 S/CO — SIGNIFICANT CHANGE UP (ref 0–0.99)
HCV AB SERPL-IMP: SIGNIFICANT CHANGE UP
HGB BLD-MCNC: 14.6 G/DL — SIGNIFICANT CHANGE UP (ref 13–17)
IANC: 7.26 K/UL — SIGNIFICANT CHANGE UP (ref 1.8–7.4)
IMM GRANULOCYTES NFR BLD AUTO: 1 % — HIGH (ref 0–0.9)
LYMPHOCYTES # BLD AUTO: 3.99 K/UL — HIGH (ref 1–3.3)
LYMPHOCYTES # BLD AUTO: 31.7 % — SIGNIFICANT CHANGE UP (ref 13–44)
MAGNESIUM SERPL-MCNC: 2 MG/DL — SIGNIFICANT CHANGE UP (ref 1.6–2.6)
MCHC RBC-ENTMCNC: 28.5 PG — SIGNIFICANT CHANGE UP (ref 27–34)
MCHC RBC-ENTMCNC: 33.4 G/DL — SIGNIFICANT CHANGE UP (ref 32–36)
MCV RBC AUTO: 85.4 FL — SIGNIFICANT CHANGE UP (ref 80–100)
MONOCYTES # BLD AUTO: 0.91 K/UL — HIGH (ref 0–0.9)
MONOCYTES NFR BLD AUTO: 7.2 % — SIGNIFICANT CHANGE UP (ref 2–14)
NEUTROPHILS # BLD AUTO: 7.26 K/UL — SIGNIFICANT CHANGE UP (ref 1.8–7.4)
NEUTROPHILS NFR BLD AUTO: 57.8 % — SIGNIFICANT CHANGE UP (ref 43–77)
NRBC # BLD: 0 /100 WBCS — SIGNIFICANT CHANGE UP (ref 0–0)
NRBC # FLD: 0 K/UL — SIGNIFICANT CHANGE UP (ref 0–0)
PHOSPHATE SERPL-MCNC: 3.9 MG/DL — SIGNIFICANT CHANGE UP (ref 2.5–4.5)
PLATELET # BLD AUTO: 252 K/UL — SIGNIFICANT CHANGE UP (ref 150–400)
POTASSIUM SERPL-MCNC: 4 MMOL/L — SIGNIFICANT CHANGE UP (ref 3.5–5.3)
POTASSIUM SERPL-SCNC: 4 MMOL/L — SIGNIFICANT CHANGE UP (ref 3.5–5.3)
RBC # BLD: 5.12 M/UL — SIGNIFICANT CHANGE UP (ref 4.2–5.8)
RBC # FLD: 12.3 % — SIGNIFICANT CHANGE UP (ref 10.3–14.5)
SODIUM SERPL-SCNC: 135 MMOL/L — SIGNIFICANT CHANGE UP (ref 135–145)
WBC # BLD: 12.57 K/UL — HIGH (ref 3.8–10.5)
WBC # FLD AUTO: 12.57 K/UL — HIGH (ref 3.8–10.5)

## 2024-12-08 PROCEDURE — 99232 SBSQ HOSP IP/OBS MODERATE 35: CPT

## 2024-12-08 NOTE — PROGRESS NOTE ADULT - PROBLEM SELECTOR PLAN 3
B12 level 212  - start oral supplementation B12 level 212  - start oral supplementation, continue on discharge.

## 2024-12-08 NOTE — PROGRESS NOTE ADULT - PROBLEM SELECTOR PLAN 5
- [facility] emailed for med rec  - resume zyprexa when dose is determined  - f/u  recommendations - [facility] pharmacy emailed for med rec  - resume zyprexa when dose is determined  - f/u  recommendations,  signed off.

## 2024-12-08 NOTE — PROGRESS NOTE ADULT - ASSESSMENT
19M hx schizophrenia vs. bipolar vs. schizoaff, on Zyprexa but recently ran out, comes to ED for waxing-waning headache. Neuro initially concerned for structural vs. infectious vs. metabolic vs. epileptic cause, pt unable to tolerate LP x 2. No seizure on EEG. Low concern for meningitis,  CSF studies not consistent with acute infectious process, pending further work up with MRI brain, starting treatment for latent syphilis with PCN 2.4 MU im x once a week for 3 doses.   19M hx schizophrenia vs. bipolar vs. schizoaff, on Zyprexa but recently ran out, comes to ED for waxing-waning headache. Neuro initially concerned for structural vs. infectious vs. metabolic vs. epileptic cause. No seizure on EEG. Low concern for meningitis,  Tested + for syphilis (unclear if treated in the past). CSF studies not consistent with acute infectious process, pending further work up with MRI brain, starting treatment for latent syphilis with PCN 2.4 MU im x once a week for 3 doses.    Dispo: home pending MRI, neuro clearance.   Will need either PCP or ID follow up to get the remaining PCN shots.

## 2024-12-08 NOTE — PROGRESS NOTE ADULT - TIME BILLING
The necessity of the time spent during the encounter on this date of service was due to:     Time spent reviewing the chart documentation, reviewing labs and imaging studies, evaluating the patient, discussing the plan of care with the consultants & medical team, and documenting.

## 2024-12-08 NOTE — PROGRESS NOTE ADULT - SUBJECTIVE AND OBJECTIVE BOX
[facility] Division of Hospital Medicine  Britt Zavala MD   Available on TEAMS        12/8/24:      12/7/24:  Reports no headache, no fever.   CSF studies with low suspicion for an infectious process  Start treatment for latent Syphilis   -pending MRI brain    MEDICATIONS  (STANDING):  cefTRIAXone   IVPB 1000 milliGRAM(s) IV Intermittent every 24 hours  chlorhexidine 2% Cloths 1 Application(s) Topical <User Schedule>    MEDICATIONS  (PRN):  acetaminophen     Tablet .. 650 milliGRAM(s) Oral every 6 hours PRN Temp greater or equal to 38C (100.4F), Mild Pain (1 - 3)  aluminum hydroxide/magnesium hydroxide/simethicone Suspension 30 milliLiter(s) Oral every 4 hours PRN Dyspepsia  LORazepam   Injectable 2 milliGRAM(s) IV Push every 8 hours PRN seizure >3 minutes  melatonin 3 milliGRAM(s) Oral at bedtime PRN Insomnia  ondansetron Injectable 4 milliGRAM(s) IV Push every 8 hours PRN Nausea and/or Vomiting    Vital Signs Last 24 Hrs  T(C): 36.6 (07 Dec 2024 05:00), Max: 36.8 (06 Dec 2024 21:00)  T(F): 97.8 (07 Dec 2024 05:00), Max: 98.2 (06 Dec 2024 21:00)  HR: 77 (07 Dec 2024 05:00) (72 - 88)  BP: 123/69 (07 Dec 2024 05:00) (114/52 - 132/72)  BP(mean): --  RR: 18 (07 Dec 2024 05:00) (17 - 18)  SpO2: 100% (07 Dec 2024 05:00) (100% - 100%)    Parameters below as of 07 Dec 2024 05:00  Patient On (Oxygen Delivery Method): room air        CONSTITUTIONAL: NAD, well-groomed  EYES: PERRLA; conjunctiva and sclera clear  ENMT: Moist oral mucosa, no pharyngeal injection or exudates; normal dentition  NECK: Supple, no palpable masses; no thyromegaly  RESPIRATORY: Normal respiratory effort; lungs are clear to auscultation bilaterally  CARDIOVASCULAR: Regular rate and rhythm, normal S1 and S2, no murmur/rub/gallop; No lower extremity edema; Peripheral pulses are 2+ bilaterally  ABDOMEN: Nontender to palpation, normoactive bowel sounds, no rebound/guarding; No hepatosplenomegaly  MUSCULOSKELETAL:  Normal gait; no clubbing or cyanosis of digits; no joint swelling or tenderness to palpation  PSYCH: A+O to person, place, and time; affect appropriate  NEUROLOGY: CN 2-12 are intact and symmetric; no gross sensory deficits   SKIN: No rashes; no palpable lesions      LABS:                          14.6   12.57 )-----------( 252      ( 08 Dec 2024 06:45 )             43.7     12-08    135  |  100  |  11  ----------------------------<  88  4.0   |  22  |  0.74    Ca    9.4      08 Dec 2024 06:45  Phos  3.9     12-08  Mg     2.00     12-08          LABS:                    14.3   9.89  )-----------( 245      ( 07 Dec 2024 04:55 )             42.7     138  |  102  |  12  ----------------------------<  137[H]  3.7   |  22  |  0.72    Ca    9.1      07 Dec 2024 04:55  Phos  4.0     12-07  Mg     2.20     12-07      PT/INR - ( 06 Dec 2024 05:55 )   PT: 12.4 sec;   INR: 1.04 ratio         PTT - ( 06 Dec 2024 05:55 )  PTT:32.2 sec      LABS:                    14.6   10.92 )-----------( 236      ( 06 Dec 2024 05:55 )             44.3     135  |  103  |  13  ----------------------------<  100[H]  4.3   |  21[L]  |  0.75    Ca    9.2      06 Dec 2024 05:55  Phos  3.8     12-06  Mg     2.20     12-06    PT/INR - ( 06 Dec 2024 05:55 )   PT: 12.4 sec;   INR: 1.04 ratio    PTT - ( 06 Dec 2024 05:55 )  PTT:32.2 sec    Vitamin B12, Serum: 212 pg/mL (12.03.24 @ 06:03)   Vitamin D, 25-Hydroxy: 11.1: Deficiency: Less than 20 ng/mL Folate, Serum: 8.2 ng/mL (12.03.24 @ 06:03) Thyroid Stimulating Hormone, Serum: 3.97 uIU/mL (12.03.24 @ 06:03) Respiratory Viral Panel with COVID-19 by SHERWIN (12.03.24 @ 01:45)   Rapid RVP Result: NotDetec  SARS-CoV-2: NotDetec: This Respiratory Panel uses polymerase chain reaction (PCR) to detect for   adenovirus; coronavirus (HKU1, NL63, 229E, OC43); human metapneumovirus   (hMPV); human enterovirus/rhinovirus (Entero/RV); influenza A; influenza   A/H1; influenza A/H3; influenza A/H1-2009; influenza B; parainfluenza   viruses 1, 2, 3, 4; respiratory syncytial virus; Mycoplasma pneumoniae;   Chlamydophila pneumoniae; and SARS-CoV-2.  Adenovirus (RapRVP): NotDetec  Influenza A (RapRVP): NotDetec  Influenza B (RapRVP): NotDetec  Parainfluenza 1 (RapRVP): NotDetec  Parainfluenza 2 (RapRVP): NotDetec  Parainfluenza 3 (RapRVP): NotDetec  Parainfluenza 4 (RapRVP): NotDetec  Resp Syncytial Virus (RapRVP): NotDetec  Bordetella pertussis (RapRVP): NotDetec  Bordetella parapertussis (RapRVP): NotDetec  Chlamydia pneumoniae (RapRVP): NotDetec  Mycoplasma pneumoniae (RapRVP): NotDetec  Entero/Rhinovirus (RapRVP): NotDetec  HKU1 Coronavirus (RapRVP): NotDetec  NL63 Coronavirus (RapRVP): NotDetec  229E Coronavirus (RapRVP): NotDetec  OC43 Coronavirus (RapRVP): NotDetec  hMPV (RapRVP): NotDetecFluA/FluB/RSV/COVID PCR (12.03.24 @ 01:45)   SARS-CoV-2 Result: NotDetec: This Respiratory Panel uses polymerase chain reaction (PCR) to detect for   influenza A; influenza B; respiratory syncytial virus; and SARS-CoV-2.   Test results should be correlated with clinical presentation, patient   history, and epidemiology.  Influenza A Result: NotDetec  Influenza B Result: NotDetec    Resp Syn Virus Result: NotDetecFTA Syphilis Confirmatory: Reactive: Method: Multiplex flow immunoassay for total treponema pallidum antibody (12.03.24 @ 06:03)  Treponema Pallidum Antibody Interpretation: Positive:       CT Cervical Spine No Cont (12.02.24 @ 19:20) >  IMPRESSION:    CT HEAD:  No acute intracranial hemorrhage, mass effect, or midline shift.    CT CERVICAL SPINE:  No acute fracture or traumatic subluxation.      CT Abdomen and Pelvis w/ IV Cont 12.02.24  IMPRESSION:  No acute intrathoracic, abdominal or pelvic findings.    CT Chest w/ IV Cont 12.02.24   CHEST:  LUNGS AND LARGE AIRWAYS: Patent central airways. Slightly limited   assessment of the pulmonary parenchyma due to respiratory motion   artifact. No pulmonary nodules or parenchymal consolidation. Mild   dependent atelectasis bilaterally.  PLEURA: No pleural effusion.  VESSELS: Within normal limits.  HEART: Heart size is normal. No pericardial effusion.  MEDIASTINUM AND LISA: No lymphadenopathy.  CHEST WALL AND LOWER NECK: Within normal limits.      CT Head No Cont (12.04.24 @ 12:02)   IMPRESSION:   Unremarkable head CT.    No adverse interval change.   Consider MR evaluation        Urinalysis Basic - ( 05 Dec 2024 06:42 )    Color: x / Appearance: x / SG: x / pH: x  Gluc: 97 mg/dL / Ketone: x  / Bili: x / Urobili: x   Blood: x / Protein: x / Nitrite: x   Leuk Esterase: x / RBC: x / WBC x   Sq Epi: x / Non Sq Epi: x / Bacteria: x    MICROBIOLOGY:  Urinalysis with Rflx Culture (collected 02 Dec 2024 20:35)    Culture - Blood (collected 02 Dec 2024 16:20)  Source: .Blood BLOOD  Preliminary Report (04 Dec 2024 21:01):    No growth at 48 Hours    Culture - Blood (collected 02 Dec 2024 16:00)  Source: .Blood BLOOD  Preliminary Report (04 Dec 2024 21:01):    No growth at 48 Hours      Rapid RVP Result: NotDetec (12-03 @ 01:45)        Culture - CSF with Gram Stain (collected 06 Dec 2024 11:52)  Source: .CSF CSF  Gram Stain (06 Dec 2024 15:06):    No polymorphonuclear cells seen    No organisms seen    by cytocentrifuge  Preliminary Report (07 Dec 2024 07:45):    No growth    Culture - Fungal, CSF (collected 06 Dec 2024 11:52)  Source: .CSF CSF  Preliminary Report (07 Dec 2024 23:03):    Culture is being performed. Fungal cultures are held for 4 weeks.         [facility] Division of Hospital Medicine  Britt Zavala MD   Available on TEAMS        12/8/24:  Reports no headache, no fever.   -pending MRI brain    MEDICATIONS  (STANDING):  chlorhexidine 2% Cloths 1 Application(s) Topical <User Schedule>  penicillin   G benzathine Injectable 2.4 Million Unit(s) IntraMuscular <User Schedule>    MEDICATIONS  (PRN):  acetaminophen     Tablet .. 650 milliGRAM(s) Oral every 6 hours PRN Temp greater or equal to 38C (100.4F), Mild Pain (1 - 3)  aluminum hydroxide/magnesium hydroxide/simethicone Suspension 30 milliLiter(s) Oral every 4 hours PRN Dyspepsia  LORazepam   Injectable 2 milliGRAM(s) IV Push every 8 hours PRN seizure >3 minutes  melatonin 3 milliGRAM(s) Oral at bedtime PRN Insomnia  ondansetron Injectable 4 milliGRAM(s) IV Push every 8 hours PRN Nausea and/or Vomiting    Vital Signs Last 24 Hrs  T(C): 36.4 (08 Dec 2024 13:20), Max: 37 (07 Dec 2024 17:08)  T(F): 97.6 (08 Dec 2024 13:20), Max: 98.6 (07 Dec 2024 17:08)  HR: 93 (08 Dec 2024 13:20) (70 - 93)  BP: 122/73 (08 Dec 2024 13:20) (118/71 - 138/65)  BP(mean): --  RR: 18 (08 Dec 2024 13:20) (17 - 18)  SpO2: 97% (08 Dec 2024 13:20) (97% - 100%)    Parameters below as of 08 Dec 2024 13:20  Patient On (Oxygen Delivery Method): room air        CONSTITUTIONAL: NAD, well-groomed  EYES: PERRLA; conjunctiva and sclera clear  ENMT: Moist oral mucosa, no pharyngeal injection or exudates; normal dentition  NECK: Supple, no palpable masses; no thyromegaly  RESPIRATORY: Normal respiratory effort; lungs are clear to auscultation bilaterally  CARDIOVASCULAR: Regular rate and rhythm, normal S1 and S2, no murmur/rub/gallop; No lower extremity edema; Peripheral pulses are 2+ bilaterally  ABDOMEN: Nontender to palpation, normoactive bowel sounds, no rebound/guarding; No hepatosplenomegaly  MUSCULOSKELETAL:  Normal gait; no clubbing or cyanosis of digits; no joint swelling or tenderness to palpation  PSYCH: A+O to person, place, and time; affect appropriate  NEUROLOGY: CN 2-12 are intact and symmetric; no gross sensory deficits   SKIN: No rashes; no palpable lesions      LABS:                          14.6   12.57 )-----------( 252      ( 08 Dec 2024 06:45 )             43.7     12-08    135  |  100  |  11  ----------------------------<  88  4.0   |  22  |  0.74    Ca    9.4      08 Dec 2024 06:45  Phos  3.9     12-08  Mg     2.00     12-08          LABS:                    14.3   9.89  )-----------( 245      ( 07 Dec 2024 04:55 )             42.7     138  |  102  |  12  ----------------------------<  137[H]  3.7   |  22  |  0.72    Ca    9.1      07 Dec 2024 04:55  Phos  4.0     12-07  Mg     2.20     12-07      PT/INR - ( 06 Dec 2024 05:55 )   PT: 12.4 sec;   INR: 1.04 ratio         PTT - ( 06 Dec 2024 05:55 )  PTT:32.2 sec      LABS:                    14.6   10.92 )-----------( 236      ( 06 Dec 2024 05:55 )             44.3     135  |  103  |  13  ----------------------------<  100[H]  4.3   |  21[L]  |  0.75    Ca    9.2      06 Dec 2024 05:55  Phos  3.8     12-06  Mg     2.20     12-06    PT/INR - ( 06 Dec 2024 05:55 )   PT: 12.4 sec;   INR: 1.04 ratio    PTT - ( 06 Dec 2024 05:55 )  PTT:32.2 sec    Vitamin B12, Serum: 212 pg/mL (12.03.24 @ 06:03)   Vitamin D, 25-Hydroxy: 11.1: Deficiency: Less than 20 ng/mL Folate, Serum: 8.2 ng/mL (12.03.24 @ 06:03) Thyroid Stimulating Hormone, Serum: 3.97 uIU/mL (12.03.24 @ 06:03) Respiratory Viral Panel with COVID-19 by SHERWIN (12.03.24 @ 01:45)   Rapid RVP Result: NotDetec  SARS-CoV-2: NotDetec: This Respiratory Panel uses polymerase chain reaction (PCR) to detect for   adenovirus; coronavirus (HKU1, NL63, 229E, OC43); human metapneumovirus   (hMPV); human enterovirus/rhinovirus (Entero/RV); influenza A; influenza   A/H1; influenza A/H3; influenza A/H1-2009; influenza B; parainfluenza   viruses 1, 2, 3, 4; respiratory syncytial virus; Mycoplasma pneumoniae;   Chlamydophila pneumoniae; and SARS-CoV-2.  Adenovirus (RapRVP): NotDetec  Influenza A (RapRVP): NotDetec  Influenza B (RapRVP): NotDetec  Parainfluenza 1 (RapRVP): NotDetec  Parainfluenza 2 (RapRVP): NotDetec  Parainfluenza 3 (RapRVP): NotDetec  Parainfluenza 4 (RapRVP): NotDetec  Resp Syncytial Virus (RapRVP): NotDetec  Bordetella pertussis (RapRVP): NotDetec  Bordetella parapertussis (RapRVP): NotDetec  Chlamydia pneumoniae (RapRVP): NotDetec  Mycoplasma pneumoniae (RapRVP): NotDetec  Entero/Rhinovirus (RapRVP): NotDetec  HKU1 Coronavirus (RapRVP): NotDetec  NL63 Coronavirus (RapRVP): NotDetec  229E Coronavirus (RapRVP): NotDetec  OC43 Coronavirus (RapRVP): NotDetec  hMPV (RapRVP): NotDetecFluA/FluB/RSV/COVID PCR (12.03.24 @ 01:45)   SARS-CoV-2 Result: NotDetec: This Respiratory Panel uses polymerase chain reaction (PCR) to detect for   influenza A; influenza B; respiratory syncytial virus; and SARS-CoV-2.   Test results should be correlated with clinical presentation, patient   history, and epidemiology.  Influenza A Result: NotDetec  Influenza B Result: NotDetec    Resp Syn Virus Result: NotDetecFTA Syphilis Confirmatory: Reactive: Method: Multiplex flow immunoassay for total treponema pallidum antibody (12.03.24 @ 06:03)  Treponema Pallidum Antibody Interpretation: Positive:       CT Cervical Spine No Cont (12.02.24 @ 19:20) >  IMPRESSION:    CT HEAD:  No acute intracranial hemorrhage, mass effect, or midline shift.    CT CERVICAL SPINE:  No acute fracture or traumatic subluxation.      CT Abdomen and Pelvis w/ IV Cont 12.02.24  IMPRESSION:  No acute intrathoracic, abdominal or pelvic findings.    CT Chest w/ IV Cont 12.02.24   CHEST:  LUNGS AND LARGE AIRWAYS: Patent central airways. Slightly limited   assessment of the pulmonary parenchyma due to respiratory motion   artifact. No pulmonary nodules or parenchymal consolidation. Mild   dependent atelectasis bilaterally.  PLEURA: No pleural effusion.  VESSELS: Within normal limits.  HEART: Heart size is normal. No pericardial effusion.  MEDIASTINUM AND LISA: No lymphadenopathy.  CHEST WALL AND LOWER NECK: Within normal limits.      CT Head No Cont (12.04.24 @ 12:02)   IMPRESSION:   Unremarkable head CT.    No adverse interval change.   Consider MR evaluation        Urinalysis Basic - ( 05 Dec 2024 06:42 )    Color: x / Appearance: x / SG: x / pH: x  Gluc: 97 mg/dL / Ketone: x  / Bili: x / Urobili: x   Blood: x / Protein: x / Nitrite: x   Leuk Esterase: x / RBC: x / WBC x   Sq Epi: x / Non Sq Epi: x / Bacteria: x    MICROBIOLOGY:  Urinalysis with Rflx Culture (collected 02 Dec 2024 20:35)    Culture - Blood (collected 02 Dec 2024 16:20)  Source: .Blood BLOOD  Preliminary Report (04 Dec 2024 21:01):    No growth at 48 Hours    Culture - Blood (collected 02 Dec 2024 16:00)  Source: .Blood BLOOD  Preliminary Report (04 Dec 2024 21:01):    No growth at 48 Hours      Rapid RVP Result: NotDetec (12-03 @ 01:45)        Culture - CSF with Gram Stain (collected 06 Dec 2024 11:52)  Source: .CSF CSF  Gram Stain (06 Dec 2024 15:06):    No polymorphonuclear cells seen    No organisms seen    by cytocentrifuge  Preliminary Report (07 Dec 2024 07:45):    No growth    Culture - Fungal, CSF (collected 06 Dec 2024 11:52)  Source: .CSF CSF  Preliminary Report (07 Dec 2024 23:03):    Culture is being performed. Fungal cultures are held for 4 weeks.

## 2024-12-09 LAB
ALBUMIN CSF-MCNC: 29.4 MG/DL — HIGH (ref 14–25)
ALBUMIN SERPL ELPH-MCNC: 4238 MG/DL — SIGNIFICANT CHANGE UP (ref 3500–5200)
ANION GAP SERPL CALC-SCNC: 13 MMOL/L — SIGNIFICANT CHANGE UP (ref 7–14)
BASOPHILS # BLD AUTO: 0.06 K/UL — SIGNIFICANT CHANGE UP (ref 0–0.2)
BASOPHILS NFR BLD AUTO: 0.5 % — SIGNIFICANT CHANGE UP (ref 0–2)
BUN SERPL-MCNC: 12 MG/DL — SIGNIFICANT CHANGE UP (ref 7–23)
C TRACH RRNA SPEC QL NAA+PROBE: SIGNIFICANT CHANGE UP
CALCIUM SERPL-MCNC: 9.3 MG/DL — SIGNIFICANT CHANGE UP (ref 8.4–10.5)
CHLORIDE SERPL-SCNC: 102 MMOL/L — SIGNIFICANT CHANGE UP (ref 98–107)
CO2 SERPL-SCNC: 22 MMOL/L — SIGNIFICANT CHANGE UP (ref 22–31)
CREAT SERPL-MCNC: 0.72 MG/DL — SIGNIFICANT CHANGE UP (ref 0.5–1.3)
EGFR: 134 ML/MIN/1.73M2 — SIGNIFICANT CHANGE UP
EOSINOPHIL # BLD AUTO: 0.21 K/UL — SIGNIFICANT CHANGE UP (ref 0–0.5)
EOSINOPHIL NFR BLD AUTO: 1.7 % — SIGNIFICANT CHANGE UP (ref 0–6)
GLUCOSE SERPL-MCNC: 95 MG/DL — SIGNIFICANT CHANGE UP (ref 70–99)
HCT VFR BLD CALC: 42.1 % — SIGNIFICANT CHANGE UP (ref 39–50)
HCV RNA SPEC NAA+PROBE-LOG IU: SIGNIFICANT CHANGE UP
HCV RNA SPEC NAA+PROBE-LOG IU: SIGNIFICANT CHANGE UP LOGIU/ML
HGB BLD-MCNC: 14 G/DL — SIGNIFICANT CHANGE UP (ref 13–17)
IANC: 7.56 K/UL — HIGH (ref 1.8–7.4)
IGG CSF-MCNC: 3.6 MG/DL — HIGH
IGG FLD-MCNC: 972 MG/DL — SIGNIFICANT CHANGE UP (ref 610–1660)
IGG SYNTH RATE SER+CSF CALC-MRATE: -0.6 MG/DAY — SIGNIFICANT CHANGE UP
IGG/ALB CLEAR SER+CSF-RTO: 0.5 — SIGNIFICANT CHANGE UP
IGG/ALB CSF: 0.12 RATIO — SIGNIFICANT CHANGE UP
IGG/ALB SER: 0.23 RATIO — SIGNIFICANT CHANGE UP
IMM GRANULOCYTES NFR BLD AUTO: 0.6 % — SIGNIFICANT CHANGE UP (ref 0–0.9)
LYMPHOCYTES # BLD AUTO: 3.79 K/UL — HIGH (ref 1–3.3)
LYMPHOCYTES # BLD AUTO: 30 % — SIGNIFICANT CHANGE UP (ref 13–44)
MAGNESIUM SERPL-MCNC: 2 MG/DL — SIGNIFICANT CHANGE UP (ref 1.6–2.6)
MCHC RBC-ENTMCNC: 27.9 PG — SIGNIFICANT CHANGE UP (ref 27–34)
MCHC RBC-ENTMCNC: 33.3 G/DL — SIGNIFICANT CHANGE UP (ref 32–36)
MCV RBC AUTO: 84 FL — SIGNIFICANT CHANGE UP (ref 80–100)
MONOCYTES # BLD AUTO: 0.92 K/UL — HIGH (ref 0–0.9)
MONOCYTES NFR BLD AUTO: 7.3 % — SIGNIFICANT CHANGE UP (ref 2–14)
N GONORRHOEA RRNA SPEC QL NAA+PROBE: SIGNIFICANT CHANGE UP
NEUTROPHILS # BLD AUTO: 7.56 K/UL — HIGH (ref 1.8–7.4)
NEUTROPHILS NFR BLD AUTO: 59.9 % — SIGNIFICANT CHANGE UP (ref 43–77)
NRBC # BLD: 0 /100 WBCS — SIGNIFICANT CHANGE UP (ref 0–0)
NRBC # FLD: 0 K/UL — SIGNIFICANT CHANGE UP (ref 0–0)
PHOSPHATE SERPL-MCNC: 4 MG/DL — SIGNIFICANT CHANGE UP (ref 2.5–4.5)
PLATELET # BLD AUTO: 236 K/UL — SIGNIFICANT CHANGE UP (ref 150–400)
POTASSIUM SERPL-MCNC: 4 MMOL/L — SIGNIFICANT CHANGE UP (ref 3.5–5.3)
POTASSIUM SERPL-SCNC: 4 MMOL/L — SIGNIFICANT CHANGE UP (ref 3.5–5.3)
RBC # BLD: 5.01 M/UL — SIGNIFICANT CHANGE UP (ref 4.2–5.8)
RBC # FLD: 12.3 % — SIGNIFICANT CHANGE UP (ref 10.3–14.5)
SODIUM SERPL-SCNC: 137 MMOL/L — SIGNIFICANT CHANGE UP (ref 135–145)
WBC # BLD: 12.62 K/UL — HIGH (ref 3.8–10.5)
WBC # FLD AUTO: 12.62 K/UL — HIGH (ref 3.8–10.5)
WNV IGG CSF IA-ACNC: POSITIVE
WNV IGM CSF IA-ACNC: NEGATIVE — SIGNIFICANT CHANGE UP

## 2024-12-09 PROCEDURE — 99232 SBSQ HOSP IP/OBS MODERATE 35: CPT

## 2024-12-09 RX ADMIN — Medication 1000 MICROGRAM(S): at 11:43

## 2024-12-09 RX ADMIN — CHLORHEXIDINE GLUCONATE 1 APPLICATION(S): 1.2 RINSE ORAL at 06:19

## 2024-12-09 NOTE — PROGRESS NOTE ADULT - PROBLEM SELECTOR PLAN 1
Possibly 2/2 neurosyphilis. Appreciate ID recommendations.   Pt with severe headaches with + FTA confirmatory test, concerning for neurosyphillis. s/p LP 12/6/24 to r/o neurosyphillis.   - LP with CSF glucose, CSF protein, CSF cell count, CSF PCR, HSV PCR, VDLR titer and CSF bacterial/fungal/viral culture not consistent with infectious process.   - off CTX  - started treatment for latent syphilis with PCN 2.4 MU im x once a week for 3 doses   - HIV, GC/Chlamydia, ESR, Hep B and C serology neg  - Cont to monitor fever and WBC curve  - headaches improving

## 2024-12-09 NOTE — PROGRESS NOTE ADULT - ASSESSMENT
19M hx schizophrenia vs. bipolar vs. schizoaff, on Zyprexa but recently ran out, comes to ED for waxing-waning headache. Neuro initially concerned for structural vs. infectious vs. metabolic vs. epileptic cause. No seizure on EEG. Low concern for meningitis,  Tested + for syphilis (unclear if treated in the past). CSF studies not consistent with acute infectious process, pending further work up with MRI brain, starting treatment for latent syphilis with PCN 2.4 MU im x once a week for 3 doses.    Dispo: home pending MRI, neuro clearance.   Will need either PCP or ID follow up to get the remaining PCN shots.

## 2024-12-09 NOTE — BH CONSULTATION LIAISON PROGRESS NOTE - MSE UNSTRUCTURED FT
Interviewed in [facility] via . AA O x 3, calm, in NAD, says he is OK. Understands he is here for headache/dizziness mgmt. Denies psychiatric concerns of depression, anxiety, panic, paranoia, hallucinations. Blunted affect/constricted, monotone, a bit odd. Insight fair at best. No focal neuro deficits appreciated.

## 2024-12-09 NOTE — BH CONSULTATION LIAISON PROGRESS NOTE - NSBHFUPINTERVALHXFT_PSY_A_CORE
Chart reviewed. No PRNs, had LP but MRI pending. Interviewed in [facility] via . AA O x 3, calm, in NAD, says he is OK. Understands he is here for headache/dizziness mgmt. Denies psychiatric concerns of depression, anxiety, panic, paranoia, hallucinations. Blunted affect/constricted, monotone, a bit odd. Insight fair at best. No focal neuro deficits appreciated.

## 2024-12-09 NOTE — BH CONSULTATION LIAISON PROGRESS NOTE - NSBHFUPINTERVALCCFT_PSY_A_CORE
"I'm OK"
Headache
Complex Repair And Burow's Graft Text: The defect edges were debeveled with a #15 scalpel blade.  The primary defect was closed partially with a complex linear closure.  Given the location of the defect, shape of the defect, the proximity to free margins and the presence of a standing cone deformity a Burow's graft was deemed most appropriate to repair the remaining defect.  The graft was trimmed to fit the size of the remaining defect.  The graft was then placed in the primary defect, oriented appropriately, and sutured into place.

## 2024-12-09 NOTE — BH CONSULTATION LIAISON PROGRESS NOTE - NSBHASSESSMENTFT_PSY_ALL_CORE
18yo man with a questionableevious psychiatric diagnosis of schizoaffective disorder or bipolar disorder or schizophrenia, possibly on Zyprexa vs a different medication, presenting with occipital headache, episode of collapsing while walking associated with confusion lasting 2-3 minutes and slight urinary incontinence. Pt's first episode several months ago led him to present to [facility] where he states he was prescribed a medication which he was taking regularly up until ten days ago when he ran out; he is unclear on what his diagnosis was at the time and is also unclear on whether the medication which he was prescribed is called Zyprexa or something else. Psych consulted for episode of unresponsiveness possibly c/f catatonia.     Given pt responsiveness, cooperation, and lack of rigidity on exam today as well as questionable previous psych dx, low concern for catatonia at this time.    12/5: Pt stable, continuing to endorse head "heaviness" and dizziness, spotty vision with standing up. Continues to deny SIIP/HIIP/AH/VH. Labs show positive treponemal pallidum antibody and FTA syphilis confirmatory.       12/9: calm, no distress/psychosis, no PRNs. Getting medical w/u    PLAN:  -Further workup as per neuro team, appreciate recs  -Attempt to get collateral from family or [facility] to determine previous diagnosis and what medication pt has been taking  -No role for 1:1 CO at this time  -No role for psychotropic medications at this time   - Agitation: Ativan 1mg PO/IM/IV q 6 PRN  - Dispo: TBD

## 2024-12-09 NOTE — PROGRESS NOTE ADULT - SUBJECTIVE AND OBJECTIVE BOX
Patient is a 19y old  Male who presents with a chief complaint of possible seizure (08 Dec 2024 10:47)      SUBJECTIVE / OVERNIGHT EVENTS: no events or complaints     ROS:  all neg unless mentioned in hpi     MEDICATIONS  (STANDING):  chlorhexidine 2% Cloths 1 Application(s) Topical <User Schedule>  cyanocobalamin 1000 MICROGram(s) Oral daily  penicillin   G benzathine Injectable 2.4 Million Unit(s) IntraMuscular <User Schedule>    MEDICATIONS  (PRN):  acetaminophen     Tablet .. 650 milliGRAM(s) Oral every 6 hours PRN Temp greater or equal to 38C (100.4F), Mild Pain (1 - 3)  aluminum hydroxide/magnesium hydroxide/simethicone Suspension 30 milliLiter(s) Oral every 4 hours PRN Dyspepsia  LORazepam   Injectable 2 milliGRAM(s) IV Push every 8 hours PRN seizure >3 minutes  melatonin 3 milliGRAM(s) Oral at bedtime PRN Insomnia  ondansetron Injectable 4 milliGRAM(s) IV Push every 8 hours PRN Nausea and/or Vomiting      T(C): 36.5 (12-09-24 @ 09:04)  HR: 67 (12-09-24 @ 09:04)  BP: 113/59 (12-09-24 @ 09:04)  RR: 18 (12-09-24 @ 09:04)  SpO2: 97% (12-09-24 @ 09:04)  CAPILLARY BLOOD GLUCOSE        I&O's Summary    08 Dec 2024 07:01  -  09 Dec 2024 07:00  --------------------------------------------------------  IN: 720 mL / OUT: 0 mL / NET: 720 mL        PHYSICAL EXAM:  GENERAL: NAD  NECK: Supple, No JVD  CHEST/LUNG: Clear to auscultation bilaterally  HEART: Regular rate and rhythm; No murmurs, rubs, or gallops, No Edema  ABDOMEN: Soft, Nontender, Nondistended; Bowel sounds present  EXTREMITIES:  2+ Peripheral Pulses, No clubbing, cyanosis      LABS:                        14.0   12.62 )-----------( 236      ( 09 Dec 2024 04:50 )             42.1     12-09    137  |  102  |  12  ----------------------------<  95  4.0   |  22  |  0.72    Ca    9.3      09 Dec 2024 04:50  Phos  4.0     12-09  Mg     2.00     12-09            Urinalysis Basic - ( 09 Dec 2024 04:50 )    Color: x / Appearance: x / SG: x / pH: x  Gluc: 95 mg/dL / Ketone: x  / Bili: x / Urobili: x   Blood: x / Protein: x / Nitrite: x   Leuk Esterase: x / RBC: x / WBC x   Sq Epi: x / Non Sq Epi: x / Bacteria: x            RADIOLOGY & ADDITIONAL TESTS:    Imaging Personally Reviewed:    Consultant(s) Notes Reviewed:      Care Discussed with Consultants/Other Providers:

## 2024-12-09 NOTE — BH CONSULTATION LIAISON PROGRESS NOTE - CURRENT MEDICATION
MEDICATIONS  (STANDING):  chlorhexidine 2% Cloths 1 Application(s) Topical <User Schedule>  cyanocobalamin 1000 MICROGram(s) Oral daily  penicillin   G benzathine Injectable 2.4 Million Unit(s) IntraMuscular <User Schedule>    MEDICATIONS  (PRN):  acetaminophen     Tablet .. 650 milliGRAM(s) Oral every 6 hours PRN Temp greater or equal to 38C (100.4F), Mild Pain (1 - 3)  aluminum hydroxide/magnesium hydroxide/simethicone Suspension 30 milliLiter(s) Oral every 4 hours PRN Dyspepsia  LORazepam   Injectable 2 milliGRAM(s) IV Push every 8 hours PRN seizure >3 minutes  melatonin 3 milliGRAM(s) Oral at bedtime PRN Insomnia  ondansetron Injectable 4 milliGRAM(s) IV Push every 8 hours PRN Nausea and/or Vomiting  
MEDICATIONS  (STANDING):  azithromycin  IVPB 500 milliGRAM(s) IV Intermittent every 24 hours  cefTRIAXone   IVPB 1000 milliGRAM(s) IV Intermittent every 24 hours  chlorhexidine 2% Cloths 1 Application(s) Topical <User Schedule>    MEDICATIONS  (PRN):  acetaminophen     Tablet .. 650 milliGRAM(s) Oral every 6 hours PRN Temp greater or equal to 38C (100.4F), Mild Pain (1 - 3)  aluminum hydroxide/magnesium hydroxide/simethicone Suspension 30 milliLiter(s) Oral every 4 hours PRN Dyspepsia  LORazepam   Injectable 2 milliGRAM(s) IV Push every 8 hours PRN seizure >3 minutes  melatonin 3 milliGRAM(s) Oral at bedtime PRN Insomnia  ondansetron Injectable 4 milliGRAM(s) IV Push every 8 hours PRN Nausea and/or Vomiting

## 2024-12-09 NOTE — BH CONSULTATION LIAISON PROGRESS NOTE - NSBHCHARTREVIEWVS_PSY_A_CORE FT
Vital Signs Last 24 Hrs  T(C): 36.5 (09 Dec 2024 09:04), Max: 36.7 (09 Dec 2024 01:20)  T(F): 97.7 (09 Dec 2024 09:04), Max: 98 (09 Dec 2024 01:20)  HR: 67 (09 Dec 2024 09:04) (67 - 88)  BP: 113/59 (09 Dec 2024 09:04) (101/60 - 116/58)  BP(mean): --  RR: 18 (09 Dec 2024 09:04) (18 - 18)  SpO2: 97% (09 Dec 2024 09:04) (97% - 100%)    Parameters below as of 09 Dec 2024 09:04  Patient On (Oxygen Delivery Method): room air    
Vital Signs Last 24 Hrs  T(C): 36.4 (05 Dec 2024 05:30), Max: 36.8 (05 Dec 2024 01:30)  T(F): 97.5 (05 Dec 2024 05:30), Max: 98.2 (05 Dec 2024 01:30)  HR: 82 (05 Dec 2024 05:30) (78 - 98)  BP: 122/71 (05 Dec 2024 05:30) (122/71 - 147/77)  BP(mean): --  RR: 18 (05 Dec 2024 05:30) (17 - 18)  SpO2: 100% (05 Dec 2024 05:30) (99% - 100%)    Parameters below as of 05 Dec 2024 05:30  Patient On (Oxygen Delivery Method): room air

## 2024-12-10 LAB
ANION GAP SERPL CALC-SCNC: 11 MMOL/L — SIGNIFICANT CHANGE UP (ref 7–14)
BASOPHILS # BLD AUTO: 0.05 K/UL — SIGNIFICANT CHANGE UP (ref 0–0.2)
BASOPHILS NFR BLD AUTO: 0.4 % — SIGNIFICANT CHANGE UP (ref 0–2)
BUN SERPL-MCNC: 12 MG/DL — SIGNIFICANT CHANGE UP (ref 7–23)
CALCIUM SERPL-MCNC: 9.2 MG/DL — SIGNIFICANT CHANGE UP (ref 8.4–10.5)
CHLORIDE SERPL-SCNC: 102 MMOL/L — SIGNIFICANT CHANGE UP (ref 98–107)
CO2 SERPL-SCNC: 23 MMOL/L — SIGNIFICANT CHANGE UP (ref 22–31)
CREAT SERPL-MCNC: 0.76 MG/DL — SIGNIFICANT CHANGE UP (ref 0.5–1.3)
EBV PCR: ABNORMAL IU/ML
EGFR: 132 ML/MIN/1.73M2 — SIGNIFICANT CHANGE UP
EOSINOPHIL # BLD AUTO: 0.29 K/UL — SIGNIFICANT CHANGE UP (ref 0–0.5)
EOSINOPHIL NFR BLD AUTO: 2.4 % — SIGNIFICANT CHANGE UP (ref 0–6)
GLUCOSE SERPL-MCNC: 104 MG/DL — HIGH (ref 70–99)
HCT VFR BLD CALC: 40.7 % — SIGNIFICANT CHANGE UP (ref 39–50)
HGB BLD-MCNC: 13.7 G/DL — SIGNIFICANT CHANGE UP (ref 13–17)
IANC: 6.7 K/UL — SIGNIFICANT CHANGE UP (ref 1.8–7.4)
IMM GRANULOCYTES NFR BLD AUTO: 0.7 % — SIGNIFICANT CHANGE UP (ref 0–0.9)
INNER EAR 68KD AB FLD QL: <1.5 U/L — SIGNIFICANT CHANGE UP (ref 0–2.5)
JCPYV DNA # CSF NAA+PROBE: SIGNIFICANT CHANGE UP COPIES/ML
LYMPHOCYTES # BLD AUTO: 33.4 % — SIGNIFICANT CHANGE UP (ref 13–44)
LYMPHOCYTES # BLD AUTO: 4.03 K/UL — HIGH (ref 1–3.3)
MAGNESIUM SERPL-MCNC: 2 MG/DL — SIGNIFICANT CHANGE UP (ref 1.6–2.6)
MCHC RBC-ENTMCNC: 28.3 PG — SIGNIFICANT CHANGE UP (ref 27–34)
MCHC RBC-ENTMCNC: 33.7 G/DL — SIGNIFICANT CHANGE UP (ref 32–36)
MCV RBC AUTO: 84.1 FL — SIGNIFICANT CHANGE UP (ref 80–100)
MONOCYTES # BLD AUTO: 0.92 K/UL — HIGH (ref 0–0.9)
MONOCYTES NFR BLD AUTO: 7.6 % — SIGNIFICANT CHANGE UP (ref 2–14)
NEUTROPHILS # BLD AUTO: 6.7 K/UL — SIGNIFICANT CHANGE UP (ref 1.8–7.4)
NEUTROPHILS NFR BLD AUTO: 55.5 % — SIGNIFICANT CHANGE UP (ref 43–77)
NRBC # BLD: 0 /100 WBCS — SIGNIFICANT CHANGE UP (ref 0–0)
NRBC # FLD: 0 K/UL — SIGNIFICANT CHANGE UP (ref 0–0)
PHOSPHATE SERPL-MCNC: 4.1 MG/DL — SIGNIFICANT CHANGE UP (ref 2.5–4.5)
PLATELET # BLD AUTO: 232 K/UL — SIGNIFICANT CHANGE UP (ref 150–400)
POTASSIUM SERPL-MCNC: 3.9 MMOL/L — SIGNIFICANT CHANGE UP (ref 3.5–5.3)
POTASSIUM SERPL-SCNC: 3.9 MMOL/L — SIGNIFICANT CHANGE UP (ref 3.5–5.3)
RBC # BLD: 4.84 M/UL — SIGNIFICANT CHANGE UP (ref 4.2–5.8)
RBC # FLD: 12.2 % — SIGNIFICANT CHANGE UP (ref 10.3–14.5)
SODIUM SERPL-SCNC: 136 MMOL/L — SIGNIFICANT CHANGE UP (ref 135–145)
WBC # BLD: 12.08 K/UL — HIGH (ref 3.8–10.5)
WBC # FLD AUTO: 12.08 K/UL — HIGH (ref 3.8–10.5)

## 2024-12-10 PROCEDURE — 99232 SBSQ HOSP IP/OBS MODERATE 35: CPT

## 2024-12-10 PROCEDURE — 70553 MRI BRAIN STEM W/O & W/DYE: CPT | Mod: 26

## 2024-12-10 RX ADMIN — CHLORHEXIDINE GLUCONATE 1 APPLICATION(S): 1.2 RINSE ORAL at 05:12

## 2024-12-10 RX ADMIN — Medication 1000 MICROGRAM(S): at 11:51

## 2024-12-10 NOTE — PROGRESS NOTE ADULT - PROVIDER SPECIALTY LIST ADULT
Hospitalist
Hospitalist
Infectious Disease
Hospitalist

## 2024-12-10 NOTE — DIETITIAN INITIAL EVALUATION ADULT - PERTINENT MEDS FT
MEDICATIONS  (STANDING):  chlorhexidine 2% Cloths 1 Application(s) Topical <User Schedule>  cyanocobalamin 1000 MICROGram(s) Oral daily  penicillin   G benzathine Injectable 2.4 Million Unit(s) IntraMuscular <User Schedule>    MEDICATIONS  (PRN):  acetaminophen     Tablet .. 650 milliGRAM(s) Oral every 6 hours PRN Temp greater or equal to 38C (100.4F), Mild Pain (1 - 3)  aluminum hydroxide/magnesium hydroxide/simethicone Suspension 30 milliLiter(s) Oral every 4 hours PRN Dyspepsia  LORazepam   Injectable 2 milliGRAM(s) IV Push every 8 hours PRN seizure >3 minutes  melatonin 3 milliGRAM(s) Oral at bedtime PRN Insomnia  ondansetron Injectable 4 milliGRAM(s) IV Push every 8 hours PRN Nausea and/or Vomiting

## 2024-12-10 NOTE — PROGRESS NOTE ADULT - PROBLEM SELECTOR PROBLEM 1
Symptoms
Other encephalopathy
Head pain

## 2024-12-10 NOTE — DIETITIAN INITIAL EVALUATION ADULT - OTHER INFO
Pt unable to provide nutrition related medical hx. Pt ordered for Vegan diet. Reports eating well in house but noted breakfast tray untouched. Pt later stated he did not want to eat and Family has been bringing home meals. No height or weight, Pt states he is unsure of weight hx, possibly 85 kg. Denies chewing, swallowing difficulties or any nausea, vomiting, diarrhea, constipation. Last bowel movement 12/10. Amenable to trying therapeutic supplement.  Pt provided limited nutrition related hx. Pt ordered for Vegan diet, reports to eating well in house but noted breakfast tray untouched. Pt later stated he did not want to eat and Family has been bringing home meals. No height or weight, Pt states he is unsure of weight hx, possibly 85 kg. Denies chewing, swallowing difficulties or any nausea, vomiting, diarrhea, constipation. Last bowel movement 12/10. Amenable to trying therapeutic supplement.

## 2024-12-10 NOTE — PROGRESS NOTE ADULT - SUBJECTIVE AND OBJECTIVE BOX
Patient is a 19y old  Male who presents with a chief complaint of possible seizure (09 Dec 2024 12:00)      SUBJECTIVE / OVERNIGHT EVENTS: no events or complaints     ROS:  all neg unless mentioned in hpi     MEDICATIONS  (STANDING):  chlorhexidine 2% Cloths 1 Application(s) Topical <User Schedule>  cyanocobalamin 1000 MICROGram(s) Oral daily  penicillin   G benzathine Injectable 2.4 Million Unit(s) IntraMuscular <User Schedule>    MEDICATIONS  (PRN):  acetaminophen     Tablet .. 650 milliGRAM(s) Oral every 6 hours PRN Temp greater or equal to 38C (100.4F), Mild Pain (1 - 3)  aluminum hydroxide/magnesium hydroxide/simethicone Suspension 30 milliLiter(s) Oral every 4 hours PRN Dyspepsia  LORazepam   Injectable 2 milliGRAM(s) IV Push every 8 hours PRN seizure >3 minutes  melatonin 3 milliGRAM(s) Oral at bedtime PRN Insomnia  ondansetron Injectable 4 milliGRAM(s) IV Push every 8 hours PRN Nausea and/or Vomiting      T(C): 36.4 (12-10-24 @ 05:10)  HR: 78 (12-10-24 @ 05:10)  BP: 110/67 (12-10-24 @ 05:10)  RR: 18 (12-10-24 @ 05:10)  SpO2: 100% (12-10-24 @ 05:10)  CAPILLARY BLOOD GLUCOSE        I&O's Summary      PHYSICAL EXAM:  GENERAL: NAD  NECK: Supple, No JVD  CHEST/LUNG: Clear to auscultation bilaterally  HEART: Regular rate and rhythm; No murmurs, rubs, or gallops, No Edema  ABDOMEN: Soft, Nontender, Nondistended; Bowel sounds present  EXTREMITIES:  2+ Peripheral Pulses, No clubbing, cyanosis      LABS:                        13.7   12.08 )-----------( 232      ( 10 Dec 2024 05:15 )             40.7     12-10    136  |  102  |  12  ----------------------------<  104[H]  3.9   |  23  |  0.76    Ca    9.2      10 Dec 2024 05:15  Phos  4.1     12-10  Mg     2.00     12-10            Urinalysis Basic - ( 10 Dec 2024 05:15 )    Color: x / Appearance: x / SG: x / pH: x  Gluc: 104 mg/dL / Ketone: x  / Bili: x / Urobili: x   Blood: x / Protein: x / Nitrite: x   Leuk Esterase: x / RBC: x / WBC x   Sq Epi: x / Non Sq Epi: x / Bacteria: x            RADIOLOGY & ADDITIONAL TESTS:    Imaging Personally Reviewed:    Consultant(s) Notes Reviewed:      Care Discussed with Consultants/Other Providers:

## 2024-12-10 NOTE — PROGRESS NOTE ADULT - PROBLEM SELECTOR PLAN 7
- DVT: restart lovenox 40mg daily  - Diet: reg  - Dispo: pending above workup
- DVT: lovenox 40mg daily  - Diet: reg  - Dispo: pending [facility] and psych clearance - will complete PCN IM as out-pt    dw ACP
- DVT: lovenox 40mg daily  - Diet: reg  - Dispo: pending [facility]H and psych clearance - will complete PCN IM as out-pt    dw ACP
- DVT: restart lovenox 40mg daily  - Diet: reg  - Dispo: pending above workup
- DVT: restart lovenox 40mg daily  - Diet: reg  - Dispo: pending above workup

## 2024-12-10 NOTE — PROGRESS NOTE ADULT - REASON FOR ADMISSION
possible seizure

## 2024-12-10 NOTE — PROGRESS NOTE ADULT - PROBLEM SELECTOR PLAN 1
Possibly 2/2 neurosyphilis. Appreciate ID recommendations.   Pt with severe headaches with + FTA confirmatory test, concerning for neurosyphillis. s/p LP 12/6/24 to r/o neurosyphillis.   - LP with CSF glucose, CSF protein, CSF cell count, CSF PCR, HSV PCR, VDLR titer and CSF bacterial/fungal/viral culture not consistent with other infectious process.   - started treatment for latent syphilis with PCN 2.4 MU im x once a week for 3 doses   - HIV, GC/Chlamydia, ESR, Hep B and C serology neg  - Cont to monitor fever and WBC curve  - headaches improving

## 2024-12-10 NOTE — PROVIDER CONTACT NOTE (OTHER) - SITUATION
Received call from lab regarding a Vitamin B1 test that was sent out on 12/3. The test was cancelled because they received plasma instead of fresh frozen blood.

## 2024-12-10 NOTE — DIETITIAN INITIAL EVALUATION ADULT - PERTINENT LABORATORY DATA
12-10    136  |  102  |  12  ----------------------------<  104[H]  3.9   |  23  |  0.76    Ca    9.2      10 Dec 2024 05:15  Phos  4.1     12-10  Mg     2.00     12-10

## 2024-12-11 ENCOUNTER — TRANSCRIPTION ENCOUNTER (OUTPATIENT)
Age: 19
End: 2024-12-11

## 2024-12-11 VITALS
DIASTOLIC BLOOD PRESSURE: 85 MMHG | OXYGEN SATURATION: 99 % | RESPIRATION RATE: 16 BRPM | TEMPERATURE: 98 F | HEART RATE: 82 BPM | SYSTOLIC BLOOD PRESSURE: 129 MMHG

## 2024-12-11 LAB
C PNEUM IGM TITR SER: SIGNIFICANT CHANGE UP
C PSITTACI IGG SER-ACNC: SIGNIFICANT CHANGE UP
C PSITTACI IGM TITR SER: SIGNIFICANT CHANGE UP
C TRACH IGG TITR SER: SIGNIFICANT CHANGE UP
C TRACH IGM SER-ACNC: SIGNIFICANT CHANGE UP
CHLAMYDIA IGG SER-ACNC: ABNORMAL
CULTURE RESULTS: NO GROWTH — SIGNIFICANT CHANGE UP
MBP CSF-MCNC: 2 NG/ML — SIGNIFICANT CHANGE UP (ref 0–3.8)
SPECIMEN SOURCE: SIGNIFICANT CHANGE UP

## 2024-12-11 PROCEDURE — 99239 HOSP IP/OBS DSCHRG MGMT >30: CPT

## 2024-12-11 RX ADMIN — CHLORHEXIDINE GLUCONATE 1 APPLICATION(S): 1.2 RINSE ORAL at 05:22

## 2024-12-11 RX ADMIN — Medication 1000 MICROGRAM(S): at 11:59

## 2024-12-11 NOTE — CHART NOTE - NSCHARTNOTEFT_GEN_A_CORE
19M with hx of schizophrenia vs. bipolar vs. schizoaffective disorder, who presents with waxing and waning headache. Neuro initially concerned for structural vs. infectious vs. metabolic vs. epileptic cause. No seizure on EEG. Low concern for meningitis,  Tested + for syphilis (unclear if treated in the past). CSF studies not consistent with acute infectious process. MRI brain negative. ID recommended treatment for latent syphilis with PCN 2.4 MU im x once a week for 3 doses.     VSS. Exam Unremarkable.   Cleared by neurology, psychiatry, and ID for discharge with outpatient followup with ID and PCP.

## 2024-12-11 NOTE — DISCHARGE NOTE NURSING/CASE MANAGEMENT/SOCIAL WORK - PATIENT PORTAL LINK FT
You can access the FollowMyHealth Patient Portal offered by [facility] by registering at the following website: [email or web address]. By joining [employer]’s FollowMyHealth portal, you will also be able to view your health information using other applications (apps) compatible with our system.

## 2024-12-11 NOTE — DISCHARGE NOTE PROVIDER - HOSPITAL COURSE
19M with hx of schizophrenia vs. bipolar vs. schizoaffective disorder, who presents with waxing and waning headache. Neuro initially concerned for structural vs. infectious vs. metabolic vs. epileptic cause. No seizure on EEG. Low concern for meningitis,  Tested + for syphilis (unclear if treated in the past). CSF studies not consistent with acute infectious process. MRI brain negative. ID recommended treatment for latent syphilis with PCN 2.4 MU im x once a week for 3 doses. Cleared by neurology, psychiatry, and ID for discharge with outpatient followup with ID and PCP.

## 2024-12-11 NOTE — DISCHARGE NOTE PROVIDER - NSDCFUSCHEDAPPT_GEN_ALL_CORE_FT
[facility]  INFDISEASE [address]  Scheduled Appointment: 12/16/2024    [facility]  INFDISEASE [address]  Scheduled Appointment: 12/23/2024

## 2024-12-11 NOTE — DISCHARGE NOTE PROVIDER - NSDCMRMEDTOKEN_GEN_ALL_CORE_FT
penicillin G benzathine 2,400,000 units intramuscular injection: 2,400,000 unit(s) intramuscular every 7 days Please follow up with the infectious disease office for weekly shot.

## 2024-12-11 NOTE — DISCHARGE NOTE PROVIDER - CARE PROVIDERS DIRECT ADDRESSES
[email or web address]

## 2024-12-11 NOTE — DISCHARGE NOTE PROVIDER - NSDCFUADDAPPT_GEN_ALL_CORE_FT
APPTS ARE READY TO BE MADE: [X] YES  Best Family or Patient Contact (if needed):  Additional Information if needed:  1. Primary Care Doctor within 1 week  2. Infectious disease followup 12/14-12/16      Other comments or requests:      APPTS ARE READY TO BE MADE: [X] YES  Best Family or Patient Contact (if needed):  Additional Information if needed:    1. Primary Care Doctor within 1 week  2. Infectious disease followup 12/14-12/16      Other comments or requests:      APPTS ARE READY TO BE MADE: [X] YES  Best Family or Patient Contact (if needed):  Additional Information if needed:    1. Primary Care Doctor within 1 week    Other comments or requests:

## 2024-12-11 NOTE — DISCHARGE NOTE PROVIDER - NSFOLLOWUPCLINICS_GEN_ALL_ED_FT
[facility] - Infectious Disease  Infectious Disease  [address], [place] [address]  Phone: [phone]  Fax:      [facility] - Infectious Disease  Infectious Disease  [address], [place] [address]  Phone: [phone]  Fax:     [facility] [place]  Internal Medicine  [address] [place] [address]  Phone: [phone]  Fax: [phone]

## 2024-12-11 NOTE — DISCHARGE NOTE PROVIDER - CARE PROVIDER_API CALL
Aj Martinez  Infectious Disease  [address] [place] [address]  Phone: [phone]  Fax: [phone]  Follow Up Time:    Aj Martinez  Infectious Disease  [address] [place] [address]  Phone: [phone]  Fax: [phone]  Follow Up Time:     Courtney Simms  [facility]  [address] [place] [address]  Phone: [phone]  Fax: [phone]  Follow Up Time:

## 2024-12-11 NOTE — DISCHARGE NOTE PROVIDER - NSDCCPCAREPLAN_GEN_ALL_CORE_FT
PRINCIPAL DISCHARGE DIAGNOSIS  Diagnosis: Latent syphilis  Assessment and Plan of Treatment: You presented with a headache which has resolved. MRI did not show anything abnormal. Lumbar Puncture was also negative for infection. Other studies showed old syphilis infection, unsure if you were treated. You were seen by infectious disease and neurology. Please follow up with infectious disease for weekly penicillin injections, this is very important.     PRINCIPAL DISCHARGE DIAGNOSIS  Diagnosis: Latent syphilis  Assessment and Plan of Treatment: You presented with a headache which has resolved. MRI did not show anything abnormal. Lumbar Puncture was also negative for infection. Other studies showed old syphilis infection, unsure if you were treated. You were seen by infectious disease and neurology. Please follow up with infectious disease for weekly penicillin injections, this is very important.      SECONDARY DISCHARGE DIAGNOSES  Diagnosis: Medication management  Assessment and Plan of Treatment: Please have repeat blood work done at your primary care doctors office in 1-2 weeks. If you need a primary care doctor please follow up with Dr Simms, call her office to make a appointment.     PRINCIPAL DISCHARGE DIAGNOSIS  Diagnosis: Latent syphilis  Assessment and Plan of Treatment: You presented with a headache which has resolved. MRI did not show anything abnormal. Lumbar Puncture was also negative for infection. Other studies showed old syphilis infection, unsure if you were treated. You were seen by infectious disease and neurology. Please follow up with infectious disease for weekly penicillin injections, this is very important. Your ID appointments are scheduled for 12/16 and 12/23      SECONDARY DISCHARGE DIAGNOSES  Diagnosis: Medication management  Assessment and Plan of Treatment: Please have repeat blood work done at your primary care doctors office in 1-2 weeks. If you need a primary care doctor please follow up with Dr Simms, call her office to make a appointment.     PRINCIPAL DISCHARGE DIAGNOSIS  Diagnosis: Latent syphilis  Assessment and Plan of Treatment: You presented with a headache which has resolved. MRI did not show anything abnormal. Lumbar Puncture was also negative for infection. Other studies showed old syphilis infection, unsure if you were treated. You were seen by infectious disease and neurology. Please follow up with infectious disease for weekly penicillin injections, this is very important. Your ID appointments are scheduled for 12/16 and 12/23 at 11AM, please make sure you attend.      SECONDARY DISCHARGE DIAGNOSES  Diagnosis: Medication management  Assessment and Plan of Treatment: Please have repeat blood work done at your primary care doctors office in 1-2 weeks. If you need a primary care doctor please follow up with Dr Simms, call her office to make a appointment.

## 2024-12-11 NOTE — DISCHARGE NOTE NURSING/CASE MANAGEMENT/SOCIAL WORK - FINANCIAL ASSISTANCE
[facility] provides services at a reduced cost to those who are determined to be eligible through [facility]’s financial assistance program. Information regarding [facility]’s financial assistance program can be found by going to [email or web address] or by calling [phone].

## 2024-12-11 NOTE — CHART NOTE - NSCHARTNOTEFT_GEN_A_CORE
Discussed with pt that there is no clear need for specific outpatient psychiatric follow-up given lack of current psychiatric symptoms and unclear previous psychiatric history. Encouraged pt to be vigilant for the development of unusual thoughts or feelings and to access the [facility] if needed. Primary team please include Crisis Center phone number [phone] in discharge paperwork.    Discussed with attending psychiatrist Dr. Camilo.

## 2024-12-11 NOTE — DISCHARGE NOTE NURSING/CASE MANAGEMENT/SOCIAL WORK - NSDCFUADDAPPT_GEN_ALL_CORE_FT
APPTS ARE READY TO BE MADE: [X] YES  Best Family or Patient Contact (if needed):  Additional Information if needed:    1. Primary Care Doctor within 1 week    Other comments or requests:

## 2024-12-11 NOTE — CHART NOTE - NSCHARTNOTEFT_GEN_A_CORE
Patient discussed during rounds:    MRI Brain w/wo contrast:    IMPRESSION: Unremarkable MRI of the brain with and without contrast   except for a small amount of fluid in the left mastoid tip.    Impression: MRI Brain and EEG unrevealing for primary neurological process. CSF studies not suggestive of neurosyphilis.    Recommendations:  [] No further neurological workup inpatient, neurology will sign off. Please reconsult as needed.    Discussed with Dr. Escobar. Note and plan not finalized until attending attestation and signature.

## 2024-12-11 NOTE — DISCHARGE NOTE PROVIDER - PROVIDER TOKENS
PROVIDER:[TOKEN:[[id number]]] PROVIDER:[TOKEN:[[id number]]],PROVIDER:[TOKEN:[[id number]]]

## 2024-12-12 LAB — H CAPSUL AG CSF IA-MCNC: SIGNIFICANT CHANGE UP

## 2024-12-13 PROBLEM — Z00.00 ENCOUNTER FOR PREVENTIVE HEALTH EXAMINATION: Status: ACTIVE | Noted: 2024-12-13

## 2024-12-16 ENCOUNTER — APPOINTMENT (OUTPATIENT)
Dept: INFECTIOUS DISEASE | Facility: CLINIC | Age: 19
End: 2024-12-16
Payer: SELF-PAY

## 2024-12-16 DIAGNOSIS — A53.9 SYPHILIS, UNSPECIFIED: ICD-10-CM

## 2024-12-16 PROCEDURE — 96372 THER/PROPH/DIAG INJ SC/IM: CPT

## 2024-12-16 RX ORDER — PENICILLIN G BENZATHINE 2400000 [IU]/4ML
2400000 INJECTION, SUSPENSION INTRAMUSCULAR
Qty: 0 | Refills: 0 | Status: COMPLETED | OUTPATIENT
Start: 2024-12-16

## 2024-12-16 RX ADMIN — PENICILLIN G BENZATHINE 0 UNIT/4ML: 2400000 INJECTION, SUSPENSION INTRAMUSCULAR at 00:00

## 2024-12-23 ENCOUNTER — APPOINTMENT (OUTPATIENT)
Dept: INFECTIOUS DISEASE | Facility: CLINIC | Age: 19
End: 2024-12-23
Payer: SELF-PAY

## 2024-12-23 PROCEDURE — 96372 THER/PROPH/DIAG INJ SC/IM: CPT

## 2024-12-23 RX ORDER — PENICILLIN G BENZATHINE 2400000 [IU]/4ML
2400000 INJECTION, SUSPENSION INTRAMUSCULAR
Qty: 0 | Refills: 0 | Status: COMPLETED | OUTPATIENT
Start: 2024-12-16

## 2024-12-30 LAB — ADMARK PHOSPHO-TAU/TOTAL-TA RESULT: SIGNIFICANT CHANGE UP

## 2025-01-04 ENCOUNTER — EMERGENCY (EMERGENCY)
Facility: HOSPITAL | Age: 20
LOS: 1 days | Discharge: HOME | End: 2025-01-04
Attending: EMERGENCY MEDICINE | Admitting: EMERGENCY MEDICINE
Payer: MEDICAID

## 2025-01-04 VITALS
RESPIRATION RATE: 17 BRPM | HEART RATE: 60 BPM | TEMPERATURE: 98 F | SYSTOLIC BLOOD PRESSURE: 116 MMHG | OXYGEN SATURATION: 100 % | DIASTOLIC BLOOD PRESSURE: 72 MMHG

## 2025-01-04 VITALS
HEART RATE: 86 BPM | TEMPERATURE: 98 F | RESPIRATION RATE: 18 BRPM | SYSTOLIC BLOOD PRESSURE: 141 MMHG | DIASTOLIC BLOOD PRESSURE: 88 MMHG | OXYGEN SATURATION: 98 %

## 2025-01-04 LAB
ALBUMIN SERPL ELPH-MCNC: 4.7 G/DL — SIGNIFICANT CHANGE UP (ref 3.3–5)
ALP SERPL-CCNC: 80 U/L — SIGNIFICANT CHANGE UP (ref 40–120)
ALT FLD-CCNC: 22 U/L — SIGNIFICANT CHANGE UP (ref 4–41)
ANION GAP SERPL CALC-SCNC: 12 MMOL/L — SIGNIFICANT CHANGE UP (ref 7–14)
AST SERPL-CCNC: 19 U/L — SIGNIFICANT CHANGE UP (ref 4–40)
BASOPHILS # BLD AUTO: 0.06 K/UL — SIGNIFICANT CHANGE UP (ref 0–0.2)
BASOPHILS NFR BLD AUTO: 0.8 % — SIGNIFICANT CHANGE UP (ref 0–2)
BILIRUB SERPL-MCNC: 0.4 MG/DL — SIGNIFICANT CHANGE UP (ref 0.2–1.2)
BUN SERPL-MCNC: 16 MG/DL — SIGNIFICANT CHANGE UP (ref 7–23)
CALCIUM SERPL-MCNC: 9.5 MG/DL — SIGNIFICANT CHANGE UP (ref 8.4–10.5)
CHLORIDE SERPL-SCNC: 104 MMOL/L — SIGNIFICANT CHANGE UP (ref 98–107)
CO2 SERPL-SCNC: 22 MMOL/L — SIGNIFICANT CHANGE UP (ref 22–31)
CREAT SERPL-MCNC: 0.8 MG/DL — SIGNIFICANT CHANGE UP (ref 0.5–1.3)
CULTURE RESULTS: SIGNIFICANT CHANGE UP
EGFR: 130 ML/MIN/1.73M2 — SIGNIFICANT CHANGE UP
EOSINOPHIL # BLD AUTO: 0.38 K/UL — SIGNIFICANT CHANGE UP (ref 0–0.5)
EOSINOPHIL NFR BLD AUTO: 4.8 % — SIGNIFICANT CHANGE UP (ref 0–6)
GLUCOSE SERPL-MCNC: 93 MG/DL — SIGNIFICANT CHANGE UP (ref 70–99)
HCT VFR BLD CALC: 43.9 % — SIGNIFICANT CHANGE UP (ref 39–50)
HGB BLD-MCNC: 14.3 G/DL — SIGNIFICANT CHANGE UP (ref 13–17)
IANC: 3.09 K/UL — SIGNIFICANT CHANGE UP (ref 1.8–7.4)
IMM GRANULOCYTES NFR BLD AUTO: 0.3 % — SIGNIFICANT CHANGE UP (ref 0–0.9)
LYMPHOCYTES # BLD AUTO: 3.64 K/UL — HIGH (ref 1–3.3)
LYMPHOCYTES # BLD AUTO: 46.2 % — HIGH (ref 13–44)
MCHC RBC-ENTMCNC: 27.5 PG — SIGNIFICANT CHANGE UP (ref 27–34)
MCHC RBC-ENTMCNC: 32.6 G/DL — SIGNIFICANT CHANGE UP (ref 32–36)
MCV RBC AUTO: 84.4 FL — SIGNIFICANT CHANGE UP (ref 80–100)
MONOCYTES # BLD AUTO: 0.69 K/UL — SIGNIFICANT CHANGE UP (ref 0–0.9)
MONOCYTES NFR BLD AUTO: 8.8 % — SIGNIFICANT CHANGE UP (ref 2–14)
NEUTROPHILS # BLD AUTO: 3.09 K/UL — SIGNIFICANT CHANGE UP (ref 1.8–7.4)
NEUTROPHILS NFR BLD AUTO: 39.1 % — LOW (ref 43–77)
NRBC # BLD: 0 /100 WBCS — SIGNIFICANT CHANGE UP (ref 0–0)
NRBC # FLD: 0 K/UL — SIGNIFICANT CHANGE UP (ref 0–0)
PLATELET # BLD AUTO: 245 K/UL — SIGNIFICANT CHANGE UP (ref 150–400)
POTASSIUM SERPL-MCNC: 3.6 MMOL/L — SIGNIFICANT CHANGE UP (ref 3.5–5.3)
POTASSIUM SERPL-SCNC: 3.6 MMOL/L — SIGNIFICANT CHANGE UP (ref 3.5–5.3)
PROT SERPL-MCNC: 7.4 G/DL — SIGNIFICANT CHANGE UP (ref 6–8.3)
RBC # BLD: 5.2 M/UL — SIGNIFICANT CHANGE UP (ref 4.2–5.8)
RBC # FLD: 12.3 % — SIGNIFICANT CHANGE UP (ref 10.3–14.5)
SODIUM SERPL-SCNC: 138 MMOL/L — SIGNIFICANT CHANGE UP (ref 135–145)
SPECIMEN SOURCE: SIGNIFICANT CHANGE UP
WBC # BLD: 7.88 K/UL — SIGNIFICANT CHANGE UP (ref 3.8–10.5)
WBC # FLD AUTO: 7.88 K/UL — SIGNIFICANT CHANGE UP (ref 3.8–10.5)

## 2025-01-04 PROCEDURE — 70450 CT HEAD/BRAIN W/O DYE: CPT | Mod: 26,MC,59

## 2025-01-04 PROCEDURE — 99285 EMERGENCY DEPT VISIT HI MDM: CPT

## 2025-01-04 PROCEDURE — 70498 CT ANGIOGRAPHY NECK: CPT | Mod: 26,MC

## 2025-01-04 PROCEDURE — 70496 CT ANGIOGRAPHY HEAD: CPT | Mod: 26,MC

## 2025-01-04 RX ORDER — SODIUM CHLORIDE 9 MG/ML
1000 INJECTION, SOLUTION INTRAMUSCULAR; INTRAVENOUS; SUBCUTANEOUS ONCE
Refills: 0 | Status: COMPLETED | OUTPATIENT
Start: 2025-01-04 | End: 2025-01-04

## 2025-01-04 RX ORDER — METOCLOPRAMIDE 10 MG/1
10 TABLET ORAL ONCE
Refills: 0 | Status: COMPLETED | OUTPATIENT
Start: 2025-01-04 | End: 2025-01-04

## 2025-01-04 RX ORDER — ACETAMINOPHEN 80 MG/.8ML
1000 SOLUTION/ DROPS ORAL ONCE
Refills: 0 | Status: COMPLETED | OUTPATIENT
Start: 2025-01-04 | End: 2025-01-04

## 2025-01-04 RX ADMIN — ACETAMINOPHEN 400 MILLIGRAM(S): 80 SOLUTION/ DROPS ORAL at 11:06

## 2025-01-04 RX ADMIN — METOCLOPRAMIDE 10 MILLIGRAM(S): 10 TABLET ORAL at 11:06

## 2025-01-04 RX ADMIN — SODIUM CHLORIDE 1000 MILLILITER(S): 9 INJECTION, SOLUTION INTRAMUSCULAR; INTRAVENOUS; SUBCUTANEOUS at 11:06

## 2025-01-04 NOTE — ED ADULT NURSE NOTE - OBJECTIVE STATEMENT
Pt received in intake room 2. Pt A&Ox4, Hx of Syphilis(treated last week) c/o headache and feeling like something is pulling at the back of his tongue. Denies chest pain, sob, abd pain dizziness, blurry vision, n/v/d, fevers/chills. Respirations even and unlabored, no accessory muscle use. 20G placed to R arm, labs sent. Stretcher in lowest position, side rail up, call bell within reach.

## 2025-01-04 NOTE — ED PROVIDER NOTE - PROGRESS NOTE DETAILS
Dr. Mora: Pt states feeling better. Headache has resolved. Discussed lab and imaging results and advised rest and plenty of fluids.

## 2025-01-04 NOTE — ED PROVIDER NOTE - PATIENT PORTAL LINK FT
You can access the FollowMyHealth Patient Portal offered by [facility] by registering at the following website: [email or web address]. By joining [employer]’s FollowMyHealth portal, you will also be able to view your health information using other applications (apps) compatible with our system.

## 2025-01-04 NOTE — ED PROVIDER NOTE - CLINICAL SUMMARY MEDICAL DECISION MAKING FREE TEXT BOX
Maira  ID: [id number]  20 y/o male with PMHx of Anxiety currently being treated for latent Syphilis who presented to the ED for left side headache 4 hours PTA.   Concern for Migraine, tension headache, AVM  Will get labs and CT. Will give IVF and Reglan. unable to assess

## 2025-01-04 NOTE — ED PROVIDER NOTE - NSDCPRINTRESULTS_ED_ALL_ED
#hypokalemia   - repeat K 2.9 will give additional K   - repeat labs
Patient requests all Lab, Cardiology, and Radiology Results on their Discharge Instructions

## 2025-01-04 NOTE — ED PROVIDER NOTE - OBJECTIVE STATEMENT
Maira  ID: [id number] Maira  ID: [id number]  20 y/o male with PMHx of Anxiety currently being treated for latent Syphilis who presented to the ED for left side headache 4 hours PTA. Maira  ID: [id number]  20 y/o male with PMHx of Anxiety currently being treated for latent Syphilis who presented to the ED for left side headache 4 hours PTA. Pt notes having a left sided headache described as a pulling sensation. Pt denies any vision changes, fever, chills, neck pain, nausea, vomiting, chest pain, or abd pain.

## 2025-01-04 NOTE — ED ADULT TRIAGE NOTE - CHIEF COMPLAINT QUOTE
Patient has c/o headache and he feels like something is pulling at his tongue. Patient showed me paperwork where he was seen and treated for latent Syphilis and states he has been compliant and going to receive weekly medications as ordered.

## 2025-01-04 NOTE — ED PROVIDER NOTE - NSFOLLOWUPINSTRUCTIONS_ED_ALL_ED_FT
YOU WERE SEEN FOR HEADACHE    YOU HAD LABS AND IMAGING DONE    YOU HAVE BEEN DIAGNOSED WITH TENSION HEADACHE    REST AND PLENTY OF FLUIDS    MAY TAKE TYLENOL 650mg EVERY 4 HOURS AS NEEDED FOR MILD PAIN O  MAY TAKE MOTRIN 400mg EVERY 6 HOURS AS NEEDED FOR MODERATE PAIN. TAKE WITH FOOD.     FOLLOW UP WITH NEUROLOGY AS DISCUSSED.    FOLLOW UP WITH YOUR PRIMARY CARE PROVIDER WITHIN 1 WEEK.    RETURN TO THE EMERGENCY DEPARTMENT FOR WORSENING HEADACHE, VISION CHANGES, PERSISTENT VOMITING, OR FOR ANY WORSENING SYMPTOMS.

## 2025-01-10 NOTE — BH CONSULTATION LIAISON ASSESSMENT NOTE - NSBHMSEBEHAV_PSY_A_CORE
- homeless  - On furlough from [facility] where he has been for 2 weeks, charged with theft of a sweatshirt       Cooperative

## 2025-03-13 NOTE — H&P ADULT - MUSCULOSKELETAL
normal/ROM intact/normal gait/strength 5/5 bilateral upper extremities/strength 5/5 bilateral lower extremities
No